# Patient Record
(demographics unavailable — no encounter records)

---

## 2018-11-02 NOTE — PDOC
Infectious Disease Note


Vital Sign


Vital Signs





Vital Signs








  Date Time  Temp Pulse Resp B/P (MAP) Pulse Ox O2 Delivery O2 Flow Rate FiO2


 


11/2/18 11:00 98.0 100 20 125/55 (78) 93 Room Air  





 98.0       











Labs


Lab





Laboratory Tests








Test


 11/1/18


17:54 11/1/18


17:55 11/1/18


19:00 11/1/18


20:21


 


Glucose (Fingerstick)


 162 mg/dL


(70-99) 


 


 133 mg/dL


(70-99)


 


White Blood Count


 


 1.8 x10^3/uL


(4.0-11.0) 


 





 


Red Blood Count


 


 3.22 x10^6/uL


(3.50-5.40) 


 





 


Hemoglobin


 


 9.4 g/dL


(12.0-15.5) 


 





 


Hematocrit


 


 27.4 %


(36.0-47.0) 


 





 


Mean Corpuscular Volume  85 fL ()   


 


Mean Corpuscular Hemoglobin  29 pg (25-35)   


 


Mean Corpuscular Hemoglobin


Concent 


 34 g/dL


(31-37) 


 





 


Red Cell Distribution Width


 


 19.6 %


(11.5-14.5) 


 





 


Platelet Count


 


 218 x10^3/uL


(140-400) 


 





 


Neutrophils (%) (Auto)  53 % (31-73)   


 


Lymphocytes (%) (Auto)  20 % (24-48)   


 


Monocytes (%) (Auto)  26 % (0-9)   


 


Eosinophils (%) (Auto)  0 % (0-3)   


 


Basophils (%) (Auto)  1 % (0-3)   


 


Neutrophils # (Auto)


 


 1.0 x10^3uL


(1.8-7.7) 


 





 


Lymphocytes # (Auto)


 


 0.4 x10^3/uL


(1.0-4.8) 


 





 


Monocytes # (Auto)


 


 0.5 x10^3/uL


(0.0-1.1) 


 





 


Eosinophils # (Auto)


 


 0.0 x10^3/uL


(0.0-0.7) 


 





 


Basophils # (Auto)


 


 0.0 x10^3/uL


(0.0-0.2) 


 





 


Segmented Neutrophils %  52 % (35-66)   


 


Band Neutrophils %  9 % (0-9)   


 


Lymphocytes %  13 % (24-48)   


 


Atypical Lymphocytes %


(Manual) 


 4 % (0-0) 


 


 





 


Monocytes %  22 % (0-10)   


 


Platelet Estimate


 


 Adequate


(ADEQUATE) 


 





 


Ovalocytes  Occ   


 


Rouleau  Present   


 


Sodium Level


 


 135 mmol/L


(136-145) 


 





 


Potassium Level


 


 3.6 mmol/L


(3.5-5.1) 


 





 


Chloride Level


 


 99 mmol/L


() 


 





 


Carbon Dioxide Level


 


 24 mmol/L


(21-32) 


 





 


Anion Gap  12 (6-14)   


 


Blood Urea Nitrogen


 


 16 mg/dL


(7-20) 


 





 


Creatinine


 


 0.8 mg/dL


(0.6-1.0) 


 





 


Estimated GFR


(Cockcroft-Gault) 


 68.5 


 


 





 


BUN/Creatinine Ratio  20 (6-20)   


 


Glucose Level


 


 171 mg/dL


(70-99) 


 





 


Calcium Level


 


 8.6 mg/dL


(8.5-10.1) 


 





 


Total Bilirubin


 


 0.5 mg/dL


(0.2-1.0) 


 





 


Aspartate Amino Transf


(AST/SGOT) 


 22 U/L (15-37) 


 


 





 


Alanine Aminotransferase


(ALT/SGPT) 


 17 U/L (14-59) 


 


 





 


Alkaline Phosphatase


 


 76 U/L


() 


 





 


Total Protein


 


 6.7 g/dL


(6.4-8.2) 


 





 


Albumin


 


 2.8 g/dL


(3.4-5.0) 


 





 


Albumin/Globulin Ratio  0.7 (1.0-1.7)   


 


Urine Collection Type   Unknown  


 


Urine Color   Yaz  


 


Urine Clarity   Clear  


 


Urine pH   5.5  


 


Urine Specific Gravity   1.025  


 


Urine Protein


 


 


 >=300 mg/dL


(NEG-TRACE) 





 


Urine Glucose (UA)


 


 


 Negative mg/dL


(NEG) 





 


Urine Ketones (Stick)


 


 


 Trace mg/dL


(NEG) 





 


Urine Blood   Small (NEG)  


 


Urine Nitrite   Negative (NEG)  


 


Urine Bilirubin   Negative (NEG)  


 


Urine Urobilinogen Dipstick


 


 


 0.2 mg/dL (0.2


mg/dL) 





 


Urine Leukocyte Esterase   Negative (NEG)  


 


Urine RBC   1-2 /HPF (0-2)  


 


Urine WBC


 


 


 11-20 /HPF


(0-4) 





 


Urine Squamous Epithelial


Cells 


 


 Few /LPF 


 





 


Urine Bacteria


 


 


 Many /HPF


(0-FEW) 





 


Urine Hyaline Casts   Many /HPF  


 


Urine Granular Casts   Few /HPF  


 


Urine Mucus   Marked /LPF  


 


Test


 11/2/18


07:37 11/2/18


10:46 11/2/18


13:50 





 


Glucose (Fingerstick)


 95 mg/dL


(70-99) 163 mg/dL


(70-99) 


 





 


White Blood Count


 


 


 2.0 x10^3/uL


(4.0-11.0) 





 


Red Blood Count


 


 


 2.94 x10^6/uL


(3.50-5.40) 





 


Hemoglobin


 


 


 8.6 g/dL


(12.0-15.5) 





 


Hematocrit


 


 


 24.8 %


(36.0-47.0) 





 


Mean Corpuscular Volume   84 fL ()  


 


Mean Corpuscular Hemoglobin   29 pg (25-35)  


 


Mean Corpuscular Hemoglobin


Concent 


 


 35 g/dL


(31-37) 





 


Red Cell Distribution Width


 


 


 20.0 %


(11.5-14.5) 





 


Platelet Count


 


 


 203 x10^3/uL


(140-400) 





 


Neutrophils (%) (Auto)   66 % (31-73)  


 


Lymphocytes (%) (Auto)   13 % (24-48)  


 


Monocytes (%) (Auto)   20 % (0-9)  


 


Eosinophils (%) (Auto)   1 % (0-3)  


 


Basophils (%) (Auto)   1 % (0-3)  


 


Neutrophils # (Auto)


 


 


 1.3 x10^3uL


(1.8-7.7) 





 


Lymphocytes # (Auto)


 


 


 0.3 x10^3/uL


(1.0-4.8) 





 


Monocytes # (Auto)


 


 


 0.4 x10^3/uL


(0.0-1.1) 





 


Eosinophils # (Auto)


 


 


 0.0 x10^3/uL


(0.0-0.7) 





 


Basophils # (Auto)


 


 


 0.0 x10^3/uL


(0.0-0.2) 














Objective


Assessment


Fever


Leukopenia. A dose Granix scheduled for tonight


Immunosuppression s/p chemoradiation


Diarrhea. C.diff pending


Herpes Zoster. 


   - Finished 7-day Famciclovir OP recently.


Pyuria, ? UTI - on cipro OP per PCP


Dysphagia 


Esophageal cancer, stage II s/p chemoradiation 


  -Diagnosed in August 2018


  -s/p Port placement on 9/17/2018


RA - on methotrexate  


A-fib, Xarelto 


DM w/ hypoglycemic episode of 60 


CAD


h/o stroke





Plan


Plan of Care


BC x 2


C. diff pending


CXR for dyspnea 


D/c cipro


Dose IV/po vanc, cefepime and micafungin need to cover potential port infection


Monitor labs/temp and renal function closely


f/u cultures results 








D/w RN 





Thank you





Attending Co-Sign


Attending Co-Sign


The patient was seen and interviewed as well as examined at the bedside. The 

chart was reviewed. The case was discussed. Agree with the plan of care.











TARUN SWANSON Nov 2, 2018 15:51


MEÑO HOOD MD Nov 2, 2018 16:20

## 2018-11-02 NOTE — RAD
Examination: CHEST AP ONLY

 

History: INPATIENT. DYSPNEA. PRIOR XRAY

 

Comparison/Correlation: CT chest abdomen and pelvis with contrast 

8/10/2018

 

Findings: Portable upright frontal views of the chest were obtained. Left 

internal jugular infusion port catheter tip terminates overlying superior 

vena cava. Sternal wires and mediastinal clips are present. Heart size is 

mildly enlarged. Pulmonary vasculature is mildly congested. No focal 

consolidation. No pneumothorax. Surgical clips involve the right axillary 

region. Minimal left costophrenic angle blunting is present.

 

Impression:

Mild CHF. Small left effusion. 

 

Electronically signed by: Angel Decker MD (11/2/2018 6:12 PM) [facility]

## 2018-11-02 NOTE — HP
ADMIT DATE:  11/01/2018



CHIEF COMPLAINT AND HISTORY OF PRESENT ILLNESS:  This 83-year-old white female

is well known to me from followup in the office.  The patient was admitted with

vomiting and diarrhea and inability to keep food down since the weekend.  She is

undergoing chemoradiation for cancer of the esophagus, currently.  The patient

was felt to be dehydrated and admitted for fluids as well as comfort care.



PAST MEDICAL HISTORY:  Remarkable for anemia, atrial fibrillation, coronary

artery disease, adenocarcinoma of the esophagus, diabetes, CVA.  She has a

history of coronary artery disease with prior stenting as well as a right

mastectomy and left lumpectomy for breast cancer and bilateral knee

replacements.



MEDICATIONS:  Brought with the patient, listed on the computer and have been

addressed.



ALLERGIES:  She is allergic to SINGULAIR and MILK.



SOCIAL HISTORY:  She is , nonsmoker, nondrinker, does not abuse drugs.



FAMILY HISTORY:  Noncontributory.



REVIEW OF SYSTEMS:  Remarkable for the weakness.  She denies any hematemesis,

melena with this.



PHYSICAL EXAMINATION:

GENERAL:  She is a well-developed, well-nourished, ill-appearing white female,

no severe distress.

VITAL SIGNS:  Stable.  She did have a temperature of 101.3 on admission and I

was not notified of the same.  I have discussed this with Oncology and with this

and leukopenia present on admission from the chemo, ID will be consulted

regarding the fever and need for antibiotics.

HEAD, EYES, EARS, NOSE AND THROAT:  Remarkable for some dryness of mucous

membranes.

NECK:  Supple, without adenopathy or thyromegaly.

CHEST:  Clear to auscultation.

HEART:  Irregularly irregular with controlled rate.

ABDOMEN:  Soft, nontender, without hepatosplenomegaly or masses.

EXTREMITIES:  Without cyanosis, clubbing, edema.

NEUROLOGIC:  She is intact.



IMPRESSION:

1.  Nausea, vomiting, diarrhea with dehydration in the face of ongoing

chemoradiation for cancer of the esophagus and leukopenia as well as stable

anemia on admission.

2.  Diabetes.

3.  Multiple other problems listed above.



PLAN:  The patient has been admitted, ongoing hydration will be obtained. 

Oncology is going to add white blood cell stimulating agents.  ID will be

consulted and the patient will be monitored, managed and treated appropriately.

 



______________________________

MARSHALL RICHARDS MD



DR:  VENKATA/tu  JOB#:  [id number] / [id number]

DD:  11/02/2018 08:51  DT:  11/02/2018 10:05

## 2018-11-02 NOTE — CONS
DATE OF CONSULTATION:  11/02/2018



REQUESTING PHYSICIAN:  Dr. Shawn Lake



REASON FOR CONSULTATION:  Esophageal cancer, status post chemoradiation therapy.



HISTORY OF PRESENT ILLNESS:  The patient is an 83-year-old  female who

was noted to have anemia in 08/2018 with hemoglobin of 8.0.  She underwent upper

endoscopy on 08/09/2018 which revealed multiple nodules in the esophagus

suggestive of malignancy.  Biopsy revealed poorly differentiated esophageal

adenocarcinoma.  CT scan of the chest, abdomen and pelvis on 08/10/2018 revealed

multiple mediastinal lymph nodes.  However, the PET scan on 08/30/2018 revealed

hypermetabolic distal esophageal mass, but no evidence of mediastinal

lymphadenopathy.  She was staged as a T1b N0 M0, stage I esophageal cancer.  She

was started on concurrent chemoradiation therapy with carboplatin and Taxol on

09/19/2018.  She received fourth dose on 10/24/2018 with carboplatin and Taxol. 

She then developed significant nausea, vomiting, generalized weakness, urinary

tract infection and shingles.  Hence chemotherapy was discontinued.  She

mentions that the radiation has also been discontinued.



She was admitted to [facility] on 11/01/2018 with poor oral

intake, generalized weakness and she also had a fever.  She also reports nausea,

vomiting and diarrhea.



The temperature was 101.3 on 11/01/2018.



PAST MEDICAL HISTORY:  TIA, coronary artery disease, CABG, hyperlipidemia,

hypertension, osteoarthritis, breast cancer, diabetes mellitus, anxiety, history

of left lumpectomy and right mastectomy.



FAMILY HISTORY:  Negative for esophageal cancer.



SOCIAL HISTORY:  No smoking or alcohol abuse.



REVIEW OF SYSTEMS:  A 12-point review of system was performed.  Pertinent

positives are mentioned in the history of present illness.  Rest of the system

review is negative.



PHYSICAL EXAMINATION:

GENERAL APPEARANCE:  The patient is an 83-year-old  female who is in no

acute cardiorespiratory distress.

VITAL SIGNS:  Blood pressure 125/55, temperature 98 degrees.

HEENT:  Head is atraumatic, normocephalic.  Eyes:  No icterus.

NECK:  Supple.

CHEST:  Bilaterally symmetrical.

HEART:  S1, S2 normal.

ABDOMEN:  Soft, nontender.

CENTRAL NERVOUS SYSTEM:  No focal deficits.

LYMPHATICS:  No lymphadenopathy.

SKIN:  Dry and warm.

PSYCHOLOGIC:  Mood and affect are appropriate.

MUSCULOSKELETAL:  No joint effusion.



LABORATORY DATA:  WBC 1.8, hemoglobin 9.4, platelet count 218, bands 9%. 

Creatinine 0.8, calcium 8.6.



IMPRESSION AND PLAN:

1.  Stage 1C poorly differentiated esophageal adenocarcinoma diagnosed on

08/09/2018.  She was started on concurrent chemoradiation on 09/19/2018.  She

received her fourth dose of carboplatin and Taxol on 10/24/2018.  Chemo was then

discontinued because of toxicities.  She had nausea, vomiting and generalized

weakness.  She subsequently also developed shingles.  No further chemotherapy

planned.  I will plan for a followup CT scan in about 2 months to evaluate for

response.

2.  Neutropenia due to chemotherapy.  I will recheck CBC today.  Plan to start

Granix 480 mcg at bedtime.

3.  Fever of 101.3 on 11/01/2018.  Consult Infectious Diseases.  Fever has now

improved.

4.  Rheumatoid arthritis.  She is on methotrexate weekly.

5.  Atrial fibrillation.  Follow up with Cardiology.

6.  History of cerebrovascular accident.

7.  Anemia.  Hemoglobin 9.4.  Continue to monitor.



I discussed with Dr. Bernardo.

 



______________________________

HERBERTH STEWART MD



DR:  JOSEPH/tu  JOB#:  [id number] / [id number]

DD:  11/02/2018 12:05  DT:  11/02/2018 22:54

## 2018-11-03 NOTE — PDOC
Infectious Disease Note


Subjective


Subjective


Rough morning


+ diarrhea w/ urgency 


Wore self out after showering and brushing teeth


+ chills 


No fevers last 24 hours





ROS


ROS


per HPI otherwise neg





Vital Sign


Vital Signs





Vital Signs








  Date Time  Temp Pulse Resp B/P (MAP) Pulse Ox O2 Delivery O2 Flow Rate FiO2


 


11/3/18 09:12  111  136/47    


 


11/3/18 08:00      Room Air  


 


11/3/18 07:00 97.7  20  92   





 97.7       











Physical Exam


PHYSICAL EXAM


GENERAL:  Sitting in the chair, alert, NAD looks well


HEENT:  Pupils equally round.  Normal conjunctivae. Oral cavity, pharynx is 

pink and moist.  No thrush.  


NECK:  Supple.


LUNGS:  Diminished aeration.


HEART:  S1 and S2.


ABDOMEN:  Obese, bowel sounds hyperactive, soft, nontender.


EXTREMITIES:  No gross edema or cyanosis.


SKIN:  Warm.  Localized rash over a sacral region 


NEUROLOGIC:  Alert and oriented x 3. 


Left-sided Port-A-Cath site without redness, swelling





Labs


Lab





Laboratory Tests








Test


 11/2/18


13:50 11/2/18


15:18 11/2/18


16:19 11/2/18


21:04


 


White Blood Count


 2.0 x10^3/uL


(4.0-11.0) 


 


 





 


Red Blood Count


 2.94 x10^6/uL


(3.50-5.40) 


 


 





 


Hemoglobin


 8.6 g/dL


(12.0-15.5) 


 


 





 


Hematocrit


 24.8 %


(36.0-47.0) 


 


 





 


Mean Corpuscular Volume 84 fL ()    


 


Mean Corpuscular Hemoglobin 29 pg (25-35)    


 


Mean Corpuscular Hemoglobin


Concent 35 g/dL


(31-37) 


 


 





 


Red Cell Distribution Width


 20.0 %


(11.5-14.5) 


 


 





 


Platelet Count


 203 x10^3/uL


(140-400) 


 


 





 


Neutrophils (%) (Auto) 66 % (31-73)    


 


Lymphocytes (%) (Auto) 13 % (24-48)    


 


Monocytes (%) (Auto) 20 % (0-9)    


 


Eosinophils (%) (Auto) 1 % (0-3)    


 


Basophils (%) (Auto) 1 % (0-3)    


 


Neutrophils # (Auto)


 1.3 x10^3uL


(1.8-7.7) 


 


 





 


Lymphocytes # (Auto)


 0.3 x10^3/uL


(1.0-4.8) 


 


 





 


Monocytes # (Auto)


 0.4 x10^3/uL


(0.0-1.1) 


 


 





 


Eosinophils # (Auto)


 0.0 x10^3/uL


(0.0-0.7) 


 


 





 


Basophils # (Auto)


 0.0 x10^3/uL


(0.0-0.2) 


 


 





 


Glucose (Fingerstick)


 


 60 mg/dL


(70-99) 100 mg/dL


(70-99) 164 mg/dL


(70-99)


 


Test


 11/3/18


04:00 11/3/18


08:22 


 





 


Creatinine


 0.7 mg/dL


(0.6-1.0) 


 


 





 


Estimated GFR


(Cockcroft-Gault) 79.9 


 


 


 





 


Glucose (Fingerstick)


 


 135 mg/dL


(70-99) 


 








CXR





Findings: Portable upright frontal views of the chest were obtained. Left 


internal jugular infusion port catheter tip terminates overlying superior 


vena cava. Sternal wires and mediastinal clips are present. Heart size is 


mildly enlarged. Pulmonary vasculature is mildly congested. No focal 


consolidation. No pneumothorax. Surgical clips involve the right axillary 


region. Minimal left costophrenic angle blunting is present.


 


Impression:


Mild CHF. Small left effusion





Objective


Assessment


Fever - looks better


Leukopenia. s/p Granix 11/2


Immunosuppression s/p chemoradiation


Diarrhea. C.diff pending


Herpes Zoster. 


   - Finished 7-day Famciclovir OP recently.


Pyuria, ? UTI - on cipro OP per PCP


Dysphagia 


Esophageal cancer, stage II s/p chemoradiation 


  -Diagnosed in August 2018


  -s/p Port placement on 9/17/2018


RA - on methotrexate  


A-fib, Xarelto 


DM w/ hypoglycemic episode of 60 


CAD


h/o stroke 


Mild CHF on cxr





Plan


Plan of Care


BC x 2 in process 


Continue IV vanc, cefepime and micafungin to need to cover potential port 

infection


Empiric po vanc. C. diff PCR pending 


Monitor labs/temp and renal function closely


f/u cultures results 


Supportive care





Attending Co-Sign


Attending Co-Sign


The patient was seen and interviewed as well as examined at the bedside. The 

chart was reviewed. The case was discussed. Agree with the plan of care.











TARUN SWANSON Nov 3, 2018 11:39


MEÑO HOOD MD Nov 3, 2018 13:21

## 2018-11-03 NOTE — CONS
DATE OF CONSULTATION:  2018



REFERRING PHYSICIAN:  Dr. Champion.



REASON FOR CONSULT:  Fever, status post chemotherapy.



HISTORY OF PRESENT ILLNESS:  The patient is an 83-year-old  female who

was diagnosed with stage 2 poorly-differentiated adenocarcinoma of the distal

esophagus in 2018.  She completed 25 rounds of radiation and 3 rounds of

chemotherapy over a week ago.  Soon after, she developed herpes zoster over her

sacral region for which she completed a 7-day course of famciclovir.  During the

last 3 days or so, she has developed nausea, vomiting and diarrhea.  She became

very weak and fatigued and had fallen at home.  She said she had a fever of

106.2 with chills.  Her white blood cell count was 1800, segs 52%, bands 9%. 

She was dehydrated and was given IV fluids.  During the interview, she started

to feel dizzy and a bit shaky.  Glucose reading was 60.  Symptoms resolved after

she ate.  The nausea and vomiting has since settled down, but she continues to

have frequent runny stools with urgency and at times incontinence and abdominal

bloating.  She was recently diagnosed with urinary tract infection and started

Cipro prior to admission.  She had had some burning sensation "inside my body

that traveled up to my chest" when she urinated.  She has been feeling

increasingly short of air with a mild dry cough and painful and at times

difficult to swallow.  She had a port placed in September and denies having any

issues.  She denies headaches or confusion.  She says the rash about the sacral

area burns and itches.



PAST MEDICAL HISTORY:  Diagnosed stage 2 poorly differentiated adenocarcinoma of

the distal esophagus in 2018, status post chemoradiation.  Herpes zoster over

sacral region.  Perla esophagus.  Atrial fibrillation, rheumatoid arthritis,

on weekly methotrexate treatment.  Coronary artery disease, diabetes mellitus. 

History of stroke/TIA.  Hypercholesterolemia, hypertension, depression, anxiety,

anemia.  History of breast cancer, pulmonary hypertension.



PAST SURGICAL HISTORY:  Port-A-Cath placement on 2018, tonsillectomy,

coronary artery bypass graft, cholecystectomy, right mastectomy, left

lumpectomy, bilateral knee surgery.



SOCIAL HISTORY:  The patient became a  in February of this year.  She has

no living children, 3  in childbirth.  She lives at home alone.  She is a

former smoker who quit in .  She drinks alcohol socially.



FAMILY HISTORY:  Noncontributory.



ALLERGIES:  MILK and MONTELUKAST.



REVIEW OF SYSTEMS:  Per HPI, otherwise all other review of systems are negative.



PHYSICAL EXAMINATION:

GENERAL:  The patient is slightly propped up in bed, alert, in no apparent

distress.

VITAL SIGNS:  Temperature is 98.0, T-max 101.3, blood pressure 125/55, heart

rate 100, respiratory rate 20, pulse oximetry is 93% on room air.  BMI 41.

HEENT:  Pupils equally round.  Normal conjunctivae. Oral cavity, pharynx is pink

and moist.  No thrush.  Upper dentures in place.

NECK:  Supple.

LUNGS:  Diminished aeration.

HEART:  S1 and S2.

ABDOMEN:  Obese, bowel sounds hyperactive, soft, nontender.

EXTREMITIES:  No gross edema or cyanosis.

SKIN:  Warm.  She has a localized rash over a sacral region covered with a

topical cream.  Left-sided Port-A-Cath site without redness, swelling or

drainage.

NEUROLOGIC:  Alert and oriented x 3.  She moves all extremities and is

ambulatory.



LABORATORY DATA:  Today's WBC 2.0, hemoglobin 8.6, platelet count 203,000,

neutrophils 66,000.  Sodium 135, potassium 3.6, creatinine 0.8.  BUN 16, glucose

171, total bilirubin 0.5, AST 22, ALT 17, albumin 2.8.  Urinalysis showed wbc's

11-20, many bacteria and a few squamous epithelial cells.  Urine culture in

process.  C. diff PCR pending.



IMPRESSION:

1.  Fever.

2.  Leukopenia.

3.  Immunosuppression, status post chemoradiation.

4.  Diarrhea.

5.  Herpes zoster treated with a 7-day course of famciclovir.

6.  Pyuria.

7.  Dysphagia.

8.  Esophageal cancer, status post chemoradiation.

9.  Rheumatoid arthritis, on weekly methotrexate treatments.

10.  Atrial fibrillation.

11.  Diabetes mellitus with hypoglycemic episode.

12.  Coronary artery disease.



PLAN:  Two sets of blood cultures have been ordered along with a chest x-ray. 

We will broaden antibiotics to cover for infection with vancomycin, cefepime and

micafungin.  We will also add p.o. vancomycin empirically as we wait for the C.

diff PCR results.  We will discontinue the Cipro.  Continue to monitor

laboratory values, temperature and renal function closely.  We will follow up on

culture results.  Supportive care.  Discussed with RN.



Thank you, Dr. Champion for asking us to participate in this patient's care.  Should

you have further questions or concerns, please call.  The patient seen, examined

and plan of care implemented by Dr. Meño Renae.



DICTATED BY:  This is Cliff Oliver, nursing practitioner

 



______________________________

MEÑO RENAE MD



DR:  JANETH/tu  JOB#:  [id number] / [id number]

DD:  2018 17:37  DT:  2018 03:25

## 2018-11-03 NOTE — PN
DATE:  11/03/2018



LOCATION:  She is in room 550.



SUBJECTIVE:  The patient is awake, alert, sitting on the toilet, just had an

accident with diarrhea and is being cleaned out.  She does feel like she feels

somewhat better than she has.  She does admit sweating during the night at least

on several occasions where she had to bathe and change clothes.



OBJECTIVE:

VITAL SIGNS:  Stable.  She has been afebrile for the last 24 hours.

GENERAL:  She is awake and alert.

CHEST:  Clear with diminished breath sounds at bases.

HEART:  Regular.

ABDOMEN:  Benign.



LABORATORY DATA:  White count was 2000 yesterday and hemoglobin was 8.6,

platelet count of 203,000.  She is receiving Neupogen for her leukopenia related

to chemoradiation.  She has 11-20 white blood cells on her admission urine.  ID

has got involved yesterday and have placed her on vancomycin as well as

micafungin and cefepime.  Blood cultures have been drawn and are negative today.



IMPRESSION:

1.  Fever with leukopenia in a patient on chemoradiation for cancer of the

esophagus.

2.  Diabetes.

3.  Vomiting and diarrhea.

4.  Anemia.

5.  Leukopenia.

6.  Atrial fibrillation.

7.  Coronary artery disease.

8.  Diabetes.

9.  Prior cerebrovascular accident.



PLAN:  Continue supportive care, IV antibiotics, await cultures and treat

symptomatically otherwise.

 



______________________________

MARSHALL RICHARDS MD



DR:  VENKATA/ut  JOB#:  [id number] / [id number]

DD:  11/03/2018 08:29  DT:  11/03/2018 11:54

## 2018-11-04 NOTE — PDOC
Infectious Disease Note


Subjective


Subjective


Feeling better over-all but still having ongoing diarrhea w/ stomach "rumblings,

" 3 so far this morning


Had some urinary retention early that released


Appetite good


Denies N/V/F/C/SOA





ROS


ROS


per HPI otherwise neg





Vital Sign


Vital Signs





Vital Signs








  Date Time  Temp Pulse Resp B/P (MAP) Pulse Ox O2 Delivery O2 Flow Rate FiO2


 


11/4/18 08:19  109  124/53    


 


11/4/18 08:00      Room Air  


 


11/4/18 07:00 97.9  20  93   





 97.9       


 


11/3/18 23:00       2.0 











Physical Exam


PHYSICAL EXAM


GENERAL:  Propped up in bed, alert, relaxed appearance 


HEENT:  Pupils equally round.  Normal conjunctivae. Oral cavity, pharynx is 

pink and moist.  No thrush.  


NECK:  Supple.


LUNGS:  Diminished aeration.


HEART:  S1 and S2.


ABDOMEN:  Obese, bowel sounds hyperactive, soft, nontender.


EXTREMITIES:  No gross edema or cyanosis.


SKIN:  Warm.  Localized rash over a sacral region 


NEUROLOGIC:  Alert and oriented x 3. 


Left-sided Port-A-Cath site without redness, swelling





Labs


Lab





Laboratory Tests








Test


 11/3/18


10:57 11/3/18


16:11 11/3/18


16:35 11/3/18


18:19


 


Glucose (Fingerstick)


 150 mg/dL


(70-99) 56 mg/dL


(70-99) 61 mg/dL


(70-99) 96 mg/dL


(70-99)


 


Test


 11/4/18


07:16 


 


 





 


Glucose (Fingerstick)


 90 mg/dL


(70-99) 


 


 











Micro





11/3/18 Blood Culture - Preliminary, Resulted


          NO GROWTH AFTER 1 DAY











URINE CULTURE RES 1  Preliminary  


        Comment





        Staphylococcus species


        25,000-50,000 colony forming units per mL





Objective


Assessment


Fever - resolved 


Leukopenia. s/p Granix 11/2 - improving 


Immunosuppression s/p chemoradiation


Diarrhea. C.diff neg 11/3 - ? Sec to chemo radiation


Herpes Zoster. 


   - Finished 7-day Famciclovir OP recently.


Pyuria, ? UTI - on cipro OP per PCP


Dysphagia 


Esophageal cancer, stage II s/p chemoradiation 


  -Diagnosed in August 2018


  -s/p Port placement on 9/17/2018


RA - on methotrexate  


A-fib, Xarelto 


DM w/ hypoglycemic episode of 60 


CAD


h/o stroke 


Mild CHF on cxr





Plan


Plan of Care


BC NGTD 


Continue IV vanc, cefepime and micafungin 


D/c po vanc


Check Stool cults - may need GI eval or scan


Monitor labs/temp and renal function closely


Supportive care 


D/w RN





Attending Co-Sign


Attending Co-Sign


The patient was seen and interviewed as well as examined at the bedside. The 

chart was reviewed. The case was discussed. Agree with the plan of care.











TARUN SWANSON Nov 4, 2018 10:41


MEÑO HOOD MD Nov 4, 2018 13:50

## 2018-11-05 NOTE — PDOC
Infectious Disease Note


Subjective


Subjective


Less diarrhea and urgency


Appetite diminished,


Hurts to swallow


Less sacral pain/burning 


No F/C/S/SOA





Vital Sign


Vital Signs





Vital Signs








  Date Time  Temp Pulse Resp B/P (MAP) Pulse Ox O2 Delivery O2 Flow Rate FiO2


 


11/5/18 11:00 98.2 119 20 142/62 (88) 90 Room Air  





 98.2       


 


11/5/18 03:00       2.0 











Physical Exam


PHYSICAL EXAM


GENERAL:  Propped up in bed, alert, relaxed appearance 


HEENT:  Oral cavity, pharynx is pink and moist.  


NECK:  Supple.


LUNGS:  Diminished aeration.


HEART:  S1 and S2.


ABDOMEN:  Obese, bowel sounds hyperactive, soft, nontender.


EXTREMITIES:  No gross edema or cyanosis.


SKIN:  Warm.  Localized rash over a sacral region - better 


NEUROLOGIC:  Alert and oriented x 3. 


Left-sided Port-A-Cath site without redness, swelling





Labs


Lab





Laboratory Tests








Test


 11/4/18


12:25 11/4/18


16:26 11/4/18


19:45 11/4/18


20:17


 


Glucose (Fingerstick)


 159 mg/dL


(70-99) 154 mg/dL


(70-99) 


 142 mg/dL


(70-99)


 


Vancomycin Level Trough


 


 


 11.8 mcg/mL


(10.0-20.0) 





 


Vancomycin Last Dose Date   Unk  


 


Vancomycin Last Dose Time   Unk  


 


Test


 11/5/18


03:50 11/5/18


07:16 11/5/18


10:39 





 


White Blood Count


 20.0 x10^3/uL


(4.0-11.0) 


 


 





 


Red Blood Count


 3.10 x10^6/uL


(3.50-5.40) 


 


 





 


Hemoglobin


 8.8 g/dL


(12.0-15.5) 


 


 





 


Hematocrit


 26.6 %


(36.0-47.0) 


 


 





 


Mean Corpuscular Volume 86 fL ()    


 


Mean Corpuscular Hemoglobin 28 pg (25-35)    


 


Mean Corpuscular Hemoglobin


Concent 33 g/dL


(31-37) 


 


 





 


Red Cell Distribution Width


 20.1 %


(11.5-14.5) 


 


 





 


Platelet Count


 228 x10^3/uL


(140-400) 


 


 





 


Neutrophils (%) (Auto) 93 % (31-73)    


 


Lymphocytes (%) (Auto) 2 % (24-48)    


 


Monocytes (%) (Auto) 4 % (0-9)    


 


Eosinophils (%) (Auto) 0 % (0-3)    


 


Basophils (%) (Auto) 1 % (0-3)    


 


Neutrophils # (Auto)


 18.6 x10^3uL


(1.8-7.7) 


 


 





 


Lymphocytes # (Auto)


 0.4 x10^3/uL


(1.0-4.8) 


 


 





 


Monocytes # (Auto)


 0.9 x10^3/uL


(0.0-1.1) 


 


 





 


Eosinophils # (Auto)


 0.0 x10^3/uL


(0.0-0.7) 


 


 





 


Basophils # (Auto)


 0.1 x10^3/uL


(0.0-0.2) 


 


 





 


Segmented Neutrophils % 73 % (35-66)    


 


Band Neutrophils % 20 % (0-9)    


 


Lymphocytes % 1 % (24-48)    


 


Monocytes % 6 % (0-10)    


 


Platelet Estimate


 Adequate


(ADEQUATE) 


 


 





 


Large Platelets Occ    


 


Anisocytosis Mod    


 


Sodium Level


 135 mmol/L


(136-145) 


 


 





 


Potassium Level


 4.0 mmol/L


(3.5-5.1) 


 


 





 


Chloride Level


 101 mmol/L


() 


 


 





 


Carbon Dioxide Level


 26 mmol/L


(21-32) 


 


 





 


Anion Gap 8 (6-14)    


 


Blood Urea Nitrogen 6 mg/dL (7-20)    


 


Creatinine


 0.8 mg/dL


(0.6-1.0) 


 


 





 


Estimated GFR


(Cockcroft-Gault) 68.5 


 


 


 





 


Glucose Level


 78 mg/dL


(70-99) 


 


 





 


Calcium Level


 7.1 mg/dL


(8.5-10.1) 


 


 





 


Glucose (Fingerstick)


 


 103 mg/dL


(70-99) 171 mg/dL


(70-99) 











Micro





11/3/18 Blood Culture - Preliminary, Resulted


          NO GROWTH AFTER 2 DAY











URINE CULTURE RES 1  Final  


        Staphylococcus aureus





        25,000-50,000 colony forming units per mL


        


  


                    MICS are expressed in micrograms per mL


           Antibiotic                 RSLT#1    


        Ciprofloxacin                  R>=8


        Gentamicin                     S<=0.5


        Levofloxacin                   R>=8


        Linezolid                      S =4


        Nitrofurantoin                 S<=16


        Oxacillin                      R>=4


        Penicillin                     R>=0.5


        Rifampin                       S<=0.5


        Tetracycline                   S<=1


        Trimethoprim/Sulfa             S<=10


        Vancomycin                     S =1





Objective


Assessment


Fever - resolved 


Leukopenia. s/p Granix 11/2 - improved


Immunosuppression s/p chemoradiation


Diarrhea. C.diff neg 11/3 - ? Sec to chemo radiation.  Staph aureus can cause 

GI issues


Herpes Zoster. 


   - Finished 7-day Famciclovir OP recently.


Pyuria, ? UTI - on cipro OP per PCP- MRSA on cult - ? contamination given neg 

nitrate/LE and + squamous cells


Dysphagia 


Esophageal cancer, stage II s/p chemoradiation 


  -Diagnosed in August 2018


  -s/p Port placement on 9/17/2018


RA - on methotrexate  


A-fib, Xarelto 


DM w/ hypoglycemic episode of 60 


CAD


h/o stroke 


Mild CHF on cxr


MRSA in urine, 11/1





Plan


Plan of Care


BC NGTD 


Continue IV vanc, cefepime and micafungin wean soon


Trough 11.8


Stool cults pending- Given MRSA in urine will check CT scan oral contrast


Monitor labs/temp and renal function closely


Supportive care 


D/w RN





Attending Co-Sign


Attending Co-Sign


The patient was seen and interviewed as well as examined at the bedside. The 

chart was reviewed. The case was discussed. Agree with the plan of care.











TARUN SWANSON Nov 5, 2018 12:04


MEÑO HOOD MD Nov 5, 2018 14:34

## 2018-11-05 NOTE — PN
DATE:  11/04/2018



ROOM:  550.



SUBJECTIVE:  The patient is awake and alert, feeling somewhat better.  Does

admit to 5 loose stools since I have seen her yesterday.



OBJECTIVE:

VITAL SIGNS:  Stable.  She is afebrile.

GENERAL:  She is awake and alert.  She remains mildly tachycardic with a heart

rate of approximately 100.



LABORATORY DATA:  Stool for C. diff is negative.  Sugars have been good.  White

count is up to 3800 this morning.  Hemoglobin was stable at 8.6.  Blood cultures

are negative to date.  There are some Staph 25-50,000 growth on her urine

culture.



IMPRESSION:

1.  Neutropenic fever, on broad spectrum IV antibiotics without a definite

source for infection to date.

2.  Diarrhea, negative for Clostridium difficile.

3.  Herpes zoster within the last couple of weeks, finished 7-day Famvir

prescription.

4.  Esophageal cancer, undergoing chemoradiation.

5.  Rheumatoid arthritis, on methotrexate.

6.  Atrial fibrillation, on Xarelto.

7.  Diabetes with good blood sugars.

8.  Coronary artery disease.



PLAN:  Continue present IV antibiotics per Pulmonology.  Stool cultures will be

checked.  If her diarrhea does not slow, GI may need to be involved here also.

 



______________________________

MARSHALL RICHARDS MD DR:  VENKATA/tu  JOB#:  [id number] / [id number]

DD:  11/04/2018 16:14  DT:  11/05/2018 01:53

## 2018-11-05 NOTE — PDOC
PROGRESS NOTES


Subjective


Subjective


HPI - f/u of Stage 1C poorly differentiated esophageal adenocarcinoma diagnosed 

on


08/09/2018.





ROS - no fever





Objective


Objective





Vital Signs








  Date Time  Temp Pulse Resp B/P (MAP) Pulse Ox O2 Delivery O2 Flow Rate FiO2


 


11/5/18 08:41  99  121/54    


 


11/5/18 07:00 98.2  20  96 Room Air  





 98.2       


 


11/5/18 03:00       2.0 














Intake and Output 


 


 11/5/18





 07:00


 


Intake Total 2400 ml


 


Output Total 1850 ml


 


Balance 550 ml


 


 


 


Intake Oral 900 ml


 


IV Total 1500 ml


 


Output Urine Total 1400 ml


 


Urine/Stool Mix 450 ml


 


# Voids 3


 


# Bowel Movements 2











Physical Exam


Heart:  Normal S1, Normal S2


General:  Alert, Oriented X3, No acute distress


Lungs:  Clear to auscultation


Neuro:  Normal speech


Psych/Mental Status:  Mental status NL





Assessment


Assessment


IMPRESSION AND PLAN:





1.  Stage 1C poorly differentiated esophageal adenocarcinoma diagnosed on


08/09/2018.  She was started on concurrent chemoradiation on 09/19/2018.  She


received her fourth dose of carboplatin and Taxol on 10/24/2018.  Chemo was then


discontinued because of toxicities.  She had nausea, vomiting and generalized


weakness.  She subsequently also developed shingles.  No further chemotherapy


planned.  I will plan for a followup CT scan in about 2 months to evaluate for


response.





2.  Neutropenia due to chemotherapy.  On Granix 480 mcg 11/2/18.


d/c on 11/5/18 as wbc 20 on 11/5/18.





3.  Fever of 101.3 on 11/01/2018.  Consulted Infectious Diseases.  Fever has now


improved.





4.  Rheumatoid arthritis.  She is on methotrexate weekly.


5.  Atrial fibrillation.  Follow up with Cardiology.


6.  History of cerebrovascular accident.


7.  Anemia.  Hemoglobin 9.4.  Continue to monitor.





I discussed with Dr. Bernardo.





Comment


Review of Relevant


I have reviewed the following items claudio (where applicable) has been applied.


Labs





Laboratory Tests








Test


 11/3/18


10:57 11/3/18


16:11 11/3/18


16:35 11/3/18


18:19


 


Glucose (Fingerstick)


 150 mg/dL


(70-99) 56 mg/dL


(70-99) 61 mg/dL


(70-99) 96 mg/dL


(70-99)


 


Test


 11/4/18


07:16 11/4/18


12:25 11/4/18


16:26 11/4/18


19:45


 


Glucose (Fingerstick)


 90 mg/dL


(70-99) 159 mg/dL


(70-99) 154 mg/dL


(70-99) 





 


Vancomycin Level Trough


 


 


 


 11.8 mcg/mL


(10.0-20.0)


 


Vancomycin Last Dose Date    Unk 


 


Vancomycin Last Dose Time    Unk 


 


Test


 11/4/18


20:17 11/5/18


03:50 11/5/18


07:16 





 


Glucose (Fingerstick)


 142 mg/dL


(70-99) 


 103 mg/dL


(70-99) 





 


White Blood Count


 


 20.0 x10^3/uL


(4.0-11.0) 


 





 


Red Blood Count


 


 3.10 x10^6/uL


(3.50-5.40) 


 





 


Hemoglobin


 


 8.8 g/dL


(12.0-15.5) 


 





 


Hematocrit


 


 26.6 %


(36.0-47.0) 


 





 


Mean Corpuscular Volume  86 fL ()   


 


Mean Corpuscular Hemoglobin  28 pg (25-35)   


 


Mean Corpuscular Hemoglobin


Concent 


 33 g/dL


(31-37) 


 





 


Red Cell Distribution Width


 


 20.1 %


(11.5-14.5) 


 





 


Platelet Count


 


 228 x10^3/uL


(140-400) 


 





 


Neutrophils (%) (Auto)  93 % (31-73)   


 


Lymphocytes (%) (Auto)  2 % (24-48)   


 


Monocytes (%) (Auto)  4 % (0-9)   


 


Eosinophils (%) (Auto)  0 % (0-3)   


 


Basophils (%) (Auto)  1 % (0-3)   


 


Neutrophils # (Auto)


 


 18.6 x10^3uL


(1.8-7.7) 


 





 


Lymphocytes # (Auto)


 


 0.4 x10^3/uL


(1.0-4.8) 


 





 


Monocytes # (Auto)


 


 0.9 x10^3/uL


(0.0-1.1) 


 





 


Eosinophils # (Auto)


 


 0.0 x10^3/uL


(0.0-0.7) 


 





 


Basophils # (Auto)


 


 0.1 x10^3/uL


(0.0-0.2) 


 





 


Sodium Level


 


 135 mmol/L


(136-145) 


 





 


Potassium Level


 


 4.0 mmol/L


(3.5-5.1) 


 





 


Chloride Level


 


 101 mmol/L


() 


 





 


Carbon Dioxide Level


 


 26 mmol/L


(21-32) 


 





 


Anion Gap  8 (6-14)   


 


Blood Urea Nitrogen  6 mg/dL (7-20)   


 


Creatinine


 


 0.8 mg/dL


(0.6-1.0) 


 





 


Estimated GFR


(Cockcroft-Gault) 


 68.5 


 


 





 


Glucose Level


 


 78 mg/dL


(70-99) 


 





 


Calcium Level


 


 7.1 mg/dL


(8.5-10.1) 


 











Laboratory Tests








Test


 11/4/18


12:25 11/4/18


16:26 11/4/18


19:45 11/4/18


20:17


 


Glucose (Fingerstick)


 159 mg/dL


(70-99) 154 mg/dL


(70-99) 


 142 mg/dL


(70-99)


 


Vancomycin Level Trough


 


 


 11.8 mcg/mL


(10.0-20.0) 





 


Vancomycin Last Dose Date   Unk  


 


Vancomycin Last Dose Time   Unk  


 


Test


 11/5/18


03:50 11/5/18


07:16 


 





 


White Blood Count


 20.0 x10^3/uL


(4.0-11.0) 


 


 





 


Red Blood Count


 3.10 x10^6/uL


(3.50-5.40) 


 


 





 


Hemoglobin


 8.8 g/dL


(12.0-15.5) 


 


 





 


Hematocrit


 26.6 %


(36.0-47.0) 


 


 





 


Mean Corpuscular Volume 86 fL ()    


 


Mean Corpuscular Hemoglobin 28 pg (25-35)    


 


Mean Corpuscular Hemoglobin


Concent 33 g/dL


(31-37) 


 


 





 


Red Cell Distribution Width


 20.1 %


(11.5-14.5) 


 


 





 


Platelet Count


 228 x10^3/uL


(140-400) 


 


 





 


Neutrophils (%) (Auto) 93 % (31-73)    


 


Lymphocytes (%) (Auto) 2 % (24-48)    


 


Monocytes (%) (Auto) 4 % (0-9)    


 


Eosinophils (%) (Auto) 0 % (0-3)    


 


Basophils (%) (Auto) 1 % (0-3)    


 


Neutrophils # (Auto)


 18.6 x10^3uL


(1.8-7.7) 


 


 





 


Lymphocytes # (Auto)


 0.4 x10^3/uL


(1.0-4.8) 


 


 





 


Monocytes # (Auto)


 0.9 x10^3/uL


(0.0-1.1) 


 


 





 


Eosinophils # (Auto)


 0.0 x10^3/uL


(0.0-0.7) 


 


 





 


Basophils # (Auto)


 0.1 x10^3/uL


(0.0-0.2) 


 


 





 


Sodium Level


 135 mmol/L


(136-145) 


 


 





 


Potassium Level


 4.0 mmol/L


(3.5-5.1) 


 


 





 


Chloride Level


 101 mmol/L


() 


 


 





 


Carbon Dioxide Level


 26 mmol/L


(21-32) 


 


 





 


Anion Gap 8 (6-14)    


 


Blood Urea Nitrogen 6 mg/dL (7-20)    


 


Creatinine


 0.8 mg/dL


(0.6-1.0) 


 


 





 


Estimated GFR


(Cockcroft-Gault) 68.5 


 


 


 





 


Glucose Level


 78 mg/dL


(70-99) 


 


 





 


Calcium Level


 7.1 mg/dL


(8.5-10.1) 


 


 





 


Glucose (Fingerstick)


 


 103 mg/dL


(70-99) 


 











Microbiology


11/3/18 Blood Culture - Preliminary, Resulted


          NO GROWTH AFTER 2 DAYS


11/1/18 Urine Culture - Final, Complete


          


11/1/18 Urine Culture Result 1 (HARRY) - Final, Complete


          


11/1/18 Antimicrobic Susceptibility - Final, Complete


Medications





Current Medications


Potassium Chloride/Sodium Chloride 1,000 ml @  100 mls/hr Q10H IV  Last 

administered on 11/5/18at 05:54;  Start 11/1/18 at 17:30


Alprazolam (Xanax) 0.25 mg PRN Q6HRS  PRN PO ANXIETY / AGITATION Last 

administered on 11/4/18at 21:05;  Start 11/1/18 at 17:15


Carvedilol (Coreg) 3.125 mg BIDWMEALS PO  Last administered on 11/5/18at 08:40;

  Start 11/1/18 at 18:00


Ciprofloxacin (Cipro) 250 mg BID PO  Last administered on 11/2/18at 10:45;  

Start 11/1/18 at 21:00;  Stop 11/2/18 at 15:50;  Status DC


Citalopram Hydrobromide (CeleXA) 10 mg DAILY PO  Last administered on 11/2/18at 

10:45;  Start 11/1/18 at 18:00;  Stop 11/3/18 at 18:26;  Status DC


Diphenhydramine HCl (Benadryl) 50 mg QHS PO  Last administered on 11/4/18at 21:

05;  Start 11/1/18 at 21:00


Docusate Sodium (Colace) 100 mg DAILY PO  Last administered on 11/5/18at 08:39;

  Start 11/1/18 at 18:00


Insulin Glargine (Lantus) 17 units DAILYWBKFT SQ  Last administered on 11/5/ 18at 08:53;  Start 11/2/18 at 08:00


Isosorbide Mononitrate (Imdur) 30 mg DAILY PO  Last administered on 11/5/18at 08

:39;  Start 11/1/18 at 18:00


Lisinopril (Prinivil) 10 mg DAILY PO  Last administered on 11/5/18at 08:41;  

Start 11/1/18 at 18:00


Rivaroxaban (Xarelto) 15 mg DAILYWSUP PO  Last administered on 11/4/18at 16:47;

  Start 11/1/18 at 18:00


Multivitamins/ Minerals (I-Josias) 1 tab BID PO  Last administered on 11/5/18at 08

:39;  Start 11/1/18 at 21:00


Bumetanide (Bumex) 2 mg DAILY PO  Last administered on 11/5/18at 08:38;  Start 

11/1/18 at 18:00


Vitamin D (Vitamin D3) 1,000 unit DAILY PO  Last administered on 11/1/18at 17:53

;  Start 11/1/18 at 18:00;  Stop 11/1/18 at 19:01;  Status DC


Non-Formulary Medication 1 ea TID PO  Last administered on 11/2/18at 10:48;  

Start 11/1/18 at 21:00


Insulin Human Lispro (HumaLOG) 4 units TIDWMEALS SQ  Last administered on 11/4/ 18at 17:00;  Start 11/1/18 at 18:00


Methotrexate (Rheumatrex) 15 mg WEEKLY PO  Last administered on 11/1/18at 17:55

;  Start 11/1/18 at 18:00


Multivitamins (Thera M Plus) 1 tab DAILY PO  Last administered on 11/5/18at 08:

39;  Start 11/1/18 at 18:00


Pantoprazole Sodium (Protonix) 40 mg DAILYAC PO  Last administered on 11/5/18at 

08:40;  Start 11/1/18 at 18:00


Ondansetron HCl (Zofran Odt) 8 mg PRN BID  PRN PO NAUSEA/VOMITING;  Start 11/1/ 18 at 17:30


Simvastatin (Zocor) 40 mg QHS PO  Last administered on 11/4/18at 21:05;  Start 

11/1/18 at 21:00


Info (Anti-Coagulation Monitoring By Pharmacy) 1 each PRN DAILY  PRN MC SEE 

COMMENTS Last administered on 11/4/18at 14:39;  Start 11/1/18 at 17:30


Lactobacillus Rhamnosus (Culturelle) 1 cap BID PO  Last administered on 11/1/ 18at 22:22;  Start 11/1/18 at 21:00;  Stop 11/2/18 at 08:28;  Status DC


Acetaminophen (Tylenol) 500 mg PRN Q6HRS  PRN PO MILD PAIN / TEMP;  Start 11/1/ 18 at 19:00


Tbo-Filgrastim (Granix) 480 mcg QHS SQ  Last administered on 11/4/18at 21:08;  

Start 11/2/18 at 21:00;  Stop 11/5/18 at 08:17;  Status DC


Vancomycin HCl (Vanco Per Pharmacy) 1 each PRN DAILY  PRN MC SEE COMMENTS Last 

administered on 11/5/18at 00:04;  Start 11/2/18 at 15:45


Cefepime HCl 1 gm/ Dextrose 50 ml @  100 mls/hr Q8HRS IV ;  Start 11/2/18 at 22:

00;  Status UNV


Micafungin Sodium 100 mg/Dextrose 100 ml @  100 mls/hr Q24H IV  Last 

administered on 11/4/18at 16:47;  Start 11/2/18 at 17:00


Cefepime HCl (Maxipime) 1 gm Q8HRS IVP  Last administered on 11/5/18at 05:55;  

Start 11/2/18 at 16:30


Vancomycin HCl 2 gm/Sodium Chloride 500 ml @  250 mls/hr 1X  ONCE IV  Last 

administered on 11/2/18at 19:36;  Start 11/2/18 at 16:30;  Stop 11/2/18 at 18:29

;  Status DC


Vancomycin HCl 1.5 gm/Sodium Chloride 500 ml @  250 mls/hr Q24H IV  Last 

administered on 11/4/18at 21:07;  Start 11/3/18 at 19:30;  Stop 11/4/18 at 23:53

;  Status DC


Vancomycin HCl (Vancomycin Trough Level) 1 each 1X  ONCE MC  Last administered 

on 11/4/18at 19:00;  Start 11/4/18 at 19:00;  Stop 11/4/18 at 19:01;  Status DC


Vancomycin HCl (Vancomycin Oral Solution) 125 mg [id number] PO  Last administered 

on 11/4/18at 10:13;  Start 11/3/18 at 13:00;  Stop 11/4/18 at 10:42;  Status DC


Dextrose (Dextrose 50%-Water Syringe) 12.5 gm PRN Q15MIN  PRN IV SEE COMMENTS;  

Start 11/3/18 at 17:00


Citalopram Hydrobromide (CeleXA) 10 mg QHS PO  Last administered on 11/4/18at 21

:05;  Start 11/3/18 at 21:00


Vancomycin HCl 1.5 gm/Sodium Chloride 500 ml @  250 mls/hr Q18H IV ;  Start 11/5 /18 at 15:30


Vancomycin HCl (Vancomycin Trough Level) 1 each 1X  ONCE MC ;  Start 11/6/18 at 

09:00;  Stop 11/6/18 at 09:01





Active Scripts


Active


Reported


Famciclovir 500 Mg Tablet 500 Mg PO TID 1 Days


Ciprofloxacin Hcl 250 Mg Tablet 1 Tab PO BID


Zofran (Ondansetron Hcl) 8 Mg Tablet 8 Mg PO BID PRN


Xarelto (Rivaroxaban) 15 Mg Tablet 15 Mg PO DAILY


Carvedilol 3.125 Mg Tablet 3.125 Mg PO BID


Methotrexate (Methotrexate Sodium) 2.5 Mg Tablet 6 Tab PO WEEKLY


Benadryl (Diphenhydramine Hcl) 25 Mg Capsule 2 Cap PO QHS


Bumetanide 2 Mg Tablet 2 Mg PO DAILY


Ocuvite Tablet (Vit A,C & E/Lutein/Minerals) 1 Each Tablet 1 Each PO BID


Citalopram Hbr (Citalopram Hydrobromide) 10 Mg Tablet 10 Mg PO DAILY


Alprazolam 0.25 Mg Tablet 0.25 Mg PO PRN Q6HRS PRN


Isosorbide Mononitrate Er (Isosorbide Mononitrate) 30 Mg Tab.er.24h 30 Mg PO 

DAILY


Prilosec Otc (Omeprazole Magnesium) 20 Mg Tablet.dr 20 Mg PO DAILY


Novolog Flexpen (Insulin Aspart) 100 Unit/1 Ml Insuln.pen 4 Unit SQ TIDBFRMEAL


Lantus Solostar (Insulin Glargine,Hum.rec.anlog) 100 Unit/1 Ml Insuln.pen 17 

Unit SQ [id number]


Vitamin D-3 (Cholecalciferol (Vitamin D3)) 2,000 Unit Capsule 1,000 Unit PO 

DAILY


Lisinopril 10 Mg Tablet 10 Mg PO DAILY


Simvastatin 40 Mg Tablet 40 Mg PO DAILY


Colace (Docusate Sodium) 100 Mg Capsule 100 Mg PO DAILY


Garlic 1,000 Mg Capsule 1,000 Mg PO DAILY


Multivitamins (Multivitamin) 1 Each Capsule 1 Each PO DAILY


Vitals/I & O





Vital Sign - Last 24 Hours








 11/4/18 11/4/18 11/4/18 11/4/18





 10:47 15:00 16:48 19:00


 


Temp 97.8 97.8  99.9





 97.8 97.8  99.9


 


Pulse 101 104 104 97


 


Resp 20 20  18


 


B/P (MAP) 122/58 (79) 125/60 (81) 125/60 112/46 (68)


 


Pulse Ox 94 95  93


 


O2 Delivery Room Air Room Air  Room Air


 


    





    





 11/4/18 11/4/18 11/5/18 11/5/18





 20:00 23:00 03:00 07:00


 


Temp  99.3 98.2 98.2





  99.3 98.2 98.2


 


Pulse  96 97 99


 


Resp  18 18 20


 


B/P (MAP)  142/42 (75) 97/40 (59) 121/54 (76)


 


Pulse Ox  98 95 96


 


O2 Delivery Room Air Nasal Cannula Nasal Cannula Room Air


 


O2 Flow Rate  2.0 2.0 


 


    





    





 11/5/18 11/5/18 11/5/18 





 08:39 08:40 08:41 


 


Pulse 99 99 99 


 


B/P (MAP) 121/54 121/54 121/54 














Intake and Output   


 


 11/4/18 11/4/18 11/5/18





 15:00 23:00 07:00


 


Intake Total 600 ml 300 ml 1500 ml


 


Output Total 1400 ml 450 ml 0 ml


 


Balance -800 ml -150 ml 1500 ml

















HERBERTH STEWART MD Nov 5, 2018 09:13

## 2018-11-06 NOTE — PDOC
Infectious Disease Note


Subjective


Subjective


Had loose stool this am and felt sweats


Still Appetite diminished,


Hurts to swallow


Less sacral pain/burning 


No F/C/S/SOA





ROS


ROS


o/w neg





Vital Sign


Vital Signs





Vital Signs








  Date Time  Temp Pulse Resp B/P (MAP) Pulse Ox O2 Delivery O2 Flow Rate FiO2


 


11/6/18 08:48  100  118/54    


 


11/6/18 07:00 100.0  20  93 Room Air  





 100.0       











Physical Exam


PHYSICAL EXAM


GENERAL:  Propped up in bed, alert, relaxed appearance 


HEENT:  Oral cavity, pharynx is pink and moist.  


NECK:  Supple.


LUNGS:  Diminished aeration.


HEART:  S1 and S2.


ABDOMEN:  Obese, bowel sounds hyperactive, soft, nontender.


EXTREMITIES:  No gross edema or cyanosis.


SKIN:  Warm.  Localized rash over a sacral region - better 


NEUROLOGIC:  Alert and oriented x 3. 


Left-sided Port-A-Cath site without redness, swelling





Labs


Lab





Laboratory Tests








Test


 11/5/18


10:39 11/5/18


16:27 11/6/18


07:57 11/6/18


08:45


 


Glucose (Fingerstick)


 171 mg/dL


(70-99) 98 mg/dL


(70-99) 117 mg/dL


(70-99) 





 


Vancomycin Level Trough


 


 


 


 22.5 mcg/mL


(10.0-20.0)


 


Vancomycin Last Dose Date    11/5/18 


 


Vancomycin Last Dose Time    1530 








Micro





Left chest Port-A-Cath. Right mastectomy. Right axillary surgical clips. 


Coronary artery disease, median sternotomy wires, prior CABG. Pulmonary 


trunk is dilated suggestive of pulmonary arterial hypertension. 


Mediastinal lymph nodes, no adenopathy. Cardiomegaly. No pericardial 


effusion.


 


No pleural effusion. Central airways are patent. Respiratory motion 


artifact. A few subcentimeter lung nodules are unchanged, for example 


image 25 in the right lung. Mild atelectasis or scarring in the lower 


lobes. Right middle lobe calcified granuloma.


 


Unchanged thickening of the distal esophagus.


 


Cholecystectomy. Subtle hypodense liver masses are similar to prior study,


much more conspicuous on PET. Calcified granulomas in the spleen. 


Pancreas, adrenal glands, and abdominal aorta caliber are normal. Moderate


atherosclerotic calcification of the abdominal aorta.


 


No hydronephrosis. Right extrarenal pelvis.


 


Stomach unremarkable. No small bowel obstruction. There is no colon wall 


thickening.


 


Urinary bladder is normal. Atrophic uterus. No pelvic free fluid.


 


Degenerative spondylosis of the lumbar spine. Grade 1 anterolisthesis of 


L4 on L5 and L5 on S1. Grade 1 retrolisthesis of L3 on L4 and L2 on L3.


 


IMPRESSION:


1.  Unchanged thickening of the distal esophagus.


2.  Subtle hypodense liver masses are not significantly changed.


3.  There are a few subcentimeter pulmonary nodules that are unchanged.








Microbiology


11/3/18 Blood Culture - Preliminary, Resulted


          NO GROWTH AFTER 3 DAYS


11/1/18 Urine Culture - Final, Complete


          


11/1/18 Urine Culture Result 1 (HARRY) - Final, Complete


          


11/1/18 Antimicrobic Susceptibility - Final, Complete





Objective


Assessment


Fever - resolved - back ? Vanc


MRSA in urine


Leukopenia. s/p Granix 11/2 - improved


Immunosuppression s/p chemoradiation


Diarrhea. C.diff neg 11/3 - ? Sec to chemo radiation.  Staph aureus can cause 

GI issues. Hopefully contrast induced loose stool.  CT unrevealing


Herpes Zoster. 


   - Finished 7-day Famciclovir OP recently.


Pyuria, ? UTI - on cipro OP per PCP- MRSA on cult - ? contamination given neg 

nitrate/LE and + squamous cells


Dysphagia 


Esophageal cancer, stage II s/p chemoradiation 


  -Diagnosed in August 2018


  -s/p Port placement on 9/17/2018


RA - on methotrexate  


A-fib, Xarelto 


DM w/ hypoglycemic episode of 60 


CAD


h/o stroke 


Mild CHF on cxr


MRSA in urine, 11/1





Plan


Plan of Care


BC NGTD 


Discontinue IV vanc and begin Zyvox (IV given loose stools)


Cont cefepime and micafungin wean soon.  Dose Zyvox


Stool cults pending


Monitor labs in am /temp and renal function closely


Supportive care 


D/w MEÑO DOVER MD Nov 6, 2018 10:01

## 2018-11-06 NOTE — PN
DATE:  11/05/2018



LOCATION:  She is in room 550.



SUBJECTIVE:  The patient is awake and alert, feeling somewhat better; however,

is still having multiple stools with greater than 10 in the last 24 hours.



OBJECTIVE:

VITAL SIGNS:  Stable.  T-max 99.9.



LABORATORY DATA:  [place] [address] this morning with white cell

stimulator per heme/onc.  Hemoglobin is stable at 8.8, platelets are 228,000. 

BMP is essentially unremarkable, although her calcium is depressed at 7.1, which

is definitely different than on admission where it was 8.6 with an albumin of

2.8.  Stool for C. diff is negative.  Other stool studies are pending at this

point in time that GI have ordered.



ASSESSMENT:

1.  Neutropenic fever, improving on broad spectrum antibiotics.

2.  Neutropenia, resolved.

3.  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

4.  Rheumatoid arthritis.

5.  Atrial fibrillation.

6.  History of cerebrovascular accident.

7.  Anemia.



PLAN:  Continue antibiotics per ID.  Await final stool studies.  I would prefer

to wait to give her something to slow down stools prior to knowing all the stool

cultures are negative.  Otherwise same.

 



______________________________

MARSHALL RICHARDS MD



DR:  VENKATA/tu  JOB#:  [id number] / [id number]

DD:  11/05/2018 17:09  DT:  11/06/2018 07:06

## 2018-11-06 NOTE — RAD
PQRS Compliance Statement:

 

One or more of the following individualized dose reduction techniques were

utilized for this examination:  

1. Automated exposure control  

2. Adjustment of the mA and/or kV according to patient size  

3. Use of iterative reconstruction technique

 

 

CT CHEST ABDOMEN PELVIS WO

 

Clinical Indication: MRSA IN URINE.  Loose stool.

 

Comparison: FDG PET/CT, skull base to upper thighs, August 30, 2018.

 

Technique: Helical CT imaging of the chest, abdomen and pelvis is 

performed without IV contrast.

 

Findings: 

Evaluation of vascular structures and solid organs is limited without IV 

contrast, decreasing sensitivity for detection of pathology.

 

Left chest Port-A-Cath. Right mastectomy. Right axillary surgical clips. 

Coronary artery disease, median sternotomy wires, prior CABG. Pulmonary 

trunk is dilated suggestive of pulmonary arterial hypertension. 

Mediastinal lymph nodes, no adenopathy. Cardiomegaly. No pericardial 

effusion.

 

No pleural effusion. Central airways are patent. Respiratory motion 

artifact. A few subcentimeter lung nodules are unchanged, for example 

image 25 in the right lung. Mild atelectasis or scarring in the lower 

lobes. Right middle lobe calcified granuloma.

 

Unchanged thickening of the distal esophagus.

 

Cholecystectomy. Subtle hypodense liver masses are similar to prior study,

much more conspicuous on PET. Calcified granulomas in the spleen. 

Pancreas, adrenal glands, and abdominal aorta caliber are normal. Moderate

atherosclerotic calcification of the abdominal aorta.

 

No hydronephrosis. Right extrarenal pelvis.

 

Stomach unremarkable. No small bowel obstruction. There is no colon wall 

thickening.

 

Urinary bladder is normal. Atrophic uterus. No pelvic free fluid.

 

Degenerative spondylosis of the lumbar spine. Grade 1 anterolisthesis of 

L4 on L5 and L5 on S1. Grade 1 retrolisthesis of L3 on L4 and L2 on L3.

 

IMPRESSION:

1.  Unchanged thickening of the distal esophagus.

2.  Subtle hypodense liver masses are not significantly changed.

3.  There are a few subcentimeter pulmonary nodules that are unchanged.

 

Electronically signed by: Tavo Jeronimo MD (11/6/2018 5:00 AM) [id number]

## 2018-11-06 NOTE — PDOC
PROGRESS NOTES


Subjective


Subjective


HPI - f/u of Stage 1C poorly differentiated esophageal adenocarcinoma diagnosed 

on


08/09/2018.





ROS - no CP, no fever





Objective


Objective





Vital Signs








  Date Time  Temp Pulse Resp B/P (MAP) Pulse Ox O2 Delivery O2 Flow Rate FiO2


 


11/6/18 08:48  100  118/54    


 


11/6/18 07:00 100.0  20  93 Room Air  





 100.0       


 


11/5/18 03:00       2.0 














Intake and Output 


 


 11/6/18





 07:00


 


Intake Total 2270 ml


 


Output Total 200 ml


 


Balance 2070 ml


 


 


 


Intake Oral 1020 ml


 


IV Total 1250 ml


 


Output Urine Total 200 ml


 


# Voids 2











Physical Exam


Heart:  Normal S1, Normal S2


General:  Alert, Oriented X3


Lungs:  Clear to auscultation


Neuro:  Normal speech


Psych/Mental Status:  Mental status NL





Assessment


Assessment


IMPRESSION AND PLAN:





1.  Stage 1C poorly differentiated esophageal adenocarcinoma diagnosed on


08/09/2018.  She was started on concurrent chemoradiation on 09/19/2018.  She


received her fourth dose of carboplatin and Taxol on 10/24/2018.  Chemo was then


discontinued because of toxicities.  She had nausea, vomiting and generalized


weakness.  She subsequently also developed shingles.  No further chemotherapy


planned.  I will plan for a followup CT scan in about 2 months to evaluate for


response.





2.  Neutropenia due to chemotherapy.  On Granix 480 mcg 11/2/18.


d/c on 11/5/18 as wbc 20 on 11/5/18.





3.  Fever of 101.3 on 11/01/2018.  Consulted Infectious Diseases.  Fever has now


improved.





4.  Rheumatoid arthritis.  She is on methotrexate weekly.


5.  Atrial fibrillation.  Follow up with Cardiology.


6.  History of cerebrovascular accident.


7.  Anemia.  Hemoglobin 8.8.  Continue to monitor.





Comment


Review of Relevant


I have reviewed the following items claudio (where applicable) has been applied.


Labs





Laboratory Tests








Test


 11/4/18


12:25 11/4/18


16:26 11/4/18


19:45 11/4/18


20:17


 


Glucose (Fingerstick)


 159 mg/dL


(70-99) 154 mg/dL


(70-99) 


 142 mg/dL


(70-99)


 


Vancomycin Level Trough


 


 


 11.8 mcg/mL


(10.0-20.0) 





 


Vancomycin Last Dose Date   Unk  


 


Vancomycin Last Dose Time   Unk  


 


Test


 11/5/18


03:50 11/5/18


07:16 11/5/18


10:39 11/5/18


16:27


 


White Blood Count


 20.0 x10^3/uL


(4.0-11.0) 


 


 





 


Red Blood Count


 3.10 x10^6/uL


(3.50-5.40) 


 


 





 


Hemoglobin


 8.8 g/dL


(12.0-15.5) 


 


 





 


Hematocrit


 26.6 %


(36.0-47.0) 


 


 





 


Mean Corpuscular Volume 86 fL ()    


 


Mean Corpuscular Hemoglobin 28 pg (25-35)    


 


Mean Corpuscular Hemoglobin


Concent 33 g/dL


(31-37) 


 


 





 


Red Cell Distribution Width


 20.1 %


(11.5-14.5) 


 


 





 


Platelet Count


 228 x10^3/uL


(140-400) 


 


 





 


Neutrophils (%) (Auto) 93 % (31-73)    


 


Lymphocytes (%) (Auto) 2 % (24-48)    


 


Monocytes (%) (Auto) 4 % (0-9)    


 


Eosinophils (%) (Auto) 0 % (0-3)    


 


Basophils (%) (Auto) 1 % (0-3)    


 


Neutrophils # (Auto)


 18.6 x10^3uL


(1.8-7.7) 


 


 





 


Lymphocytes # (Auto)


 0.4 x10^3/uL


(1.0-4.8) 


 


 





 


Monocytes # (Auto)


 0.9 x10^3/uL


(0.0-1.1) 


 


 





 


Eosinophils # (Auto)


 0.0 x10^3/uL


(0.0-0.7) 


 


 





 


Basophils # (Auto)


 0.1 x10^3/uL


(0.0-0.2) 


 


 





 


Segmented Neutrophils % 73 % (35-66)    


 


Band Neutrophils % 20 % (0-9)    


 


Lymphocytes % 1 % (24-48)    


 


Monocytes % 6 % (0-10)    


 


Platelet Estimate


 Adequate


(ADEQUATE) 


 


 





 


Large Platelets Occ    


 


Anisocytosis Mod    


 


Sodium Level


 135 mmol/L


(136-145) 


 


 





 


Potassium Level


 4.0 mmol/L


(3.5-5.1) 


 


 





 


Chloride Level


 101 mmol/L


() 


 


 





 


Carbon Dioxide Level


 26 mmol/L


(21-32) 


 


 





 


Anion Gap 8 (6-14)    


 


Blood Urea Nitrogen 6 mg/dL (7-20)    


 


Creatinine


 0.8 mg/dL


(0.6-1.0) 


 


 





 


Estimated GFR


(Cockcroft-Gault) 68.5 


 


 


 





 


Glucose Level


 78 mg/dL


(70-99) 


 


 





 


Calcium Level


 7.1 mg/dL


(8.5-10.1) 


 


 





 


Glucose (Fingerstick)


 


 103 mg/dL


(70-99) 171 mg/dL


(70-99) 98 mg/dL


(70-99)


 


Test


 11/6/18


07:57 11/6/18


08:45 11/6/18


11:20 





 


Glucose (Fingerstick)


 117 mg/dL


(70-99) 


 151 mg/dL


(70-99) 





 


Vancomycin Level Trough


 


 22.5 mcg/mL


(10.0-20.0) 


 





 


Vancomycin Last Dose Date  11/5/18   


 


Vancomycin Last Dose Time  1530   








Laboratory Tests








Test


 11/5/18


16:27 11/6/18


07:57 11/6/18


08:45 11/6/18


11:20


 


Glucose (Fingerstick)


 98 mg/dL


(70-99) 117 mg/dL


(70-99) 


 151 mg/dL


(70-99)


 


Vancomycin Level Trough


 


 


 22.5 mcg/mL


(10.0-20.0) 





 


Vancomycin Last Dose Date   11/5/18  


 


Vancomycin Last Dose Time   1530  








Microbiology


11/3/18 Blood Culture - Preliminary, Resulted


          NO GROWTH AFTER 3 DAYS


11/1/18 Urine Culture - Final, Complete


          


11/1/18 Urine Culture Result 1 (HARRY) - Final, Complete


          


11/1/18 Antimicrobic Susceptibility - Final, Complete


Medications





Current Medications


Potassium Chloride/Sodium Chloride 1,000 ml @  100 mls/hr Q10H IV  Last 

administered on 11/6/18at 08:24;  Start 11/1/18 at 17:30


Alprazolam (Xanax) 0.25 mg PRN Q6HRS  PRN PO ANXIETY / AGITATION Last 

administered on 11/5/18at 20:30;  Start 11/1/18 at 17:15


Carvedilol (Coreg) 3.125 mg BIDWMEALS PO  Last administered on 11/6/18at 08:48;

  Start 11/1/18 at 18:00


Ciprofloxacin (Cipro) 250 mg BID PO  Last administered on 11/2/18at 10:45;  

Start 11/1/18 at 21:00;  Stop 11/2/18 at 15:50;  Status DC


Citalopram Hydrobromide (CeleXA) 10 mg DAILY PO  Last administered on 11/2/18at 

10:45;  Start 11/1/18 at 18:00;  Stop 11/3/18 at 18:26;  Status DC


Diphenhydramine HCl (Benadryl) 50 mg QHS PO  Last administered on 11/5/18at 20:

24;  Start 11/1/18 at 21:00


Docusate Sodium (Colace) 100 mg DAILY PO  Last administered on 11/5/18at 08:39;

  Start 11/1/18 at 18:00


Insulin Glargine (Lantus) 17 units DAILYWBKFT SQ  Last administered on 11/6/ 18at 09:04;  Start 11/2/18 at 08:00


Isosorbide Mononitrate (Imdur) 30 mg DAILY PO  Last administered on 11/6/18at 08

:47;  Start 11/1/18 at 18:00


Lisinopril (Prinivil) 10 mg DAILY PO  Last administered on 11/6/18at 08:47;  

Start 11/1/18 at 18:00


Rivaroxaban (Xarelto) 15 mg DAILYWSUP PO  Last administered on 11/5/18at 17:32;

  Start 11/1/18 at 18:00


Multivitamins/ Minerals (I-Josias) 1 tab BID PO  Last administered on 11/6/18at 08

:46;  Start 11/1/18 at 21:00


Bumetanide (Bumex) 2 mg DAILY PO  Last administered on 11/6/18at 08:29;  Start 

11/1/18 at 18:00


Vitamin D (Vitamin D3) 1,000 unit DAILY PO  Last administered on 11/1/18at 17:53

;  Start 11/1/18 at 18:00;  Stop 11/1/18 at 19:01;  Status DC


Non-Formulary Medication 1 ea TID PO  Last administered on 11/2/18at 10:48;  

Start 11/1/18 at 21:00


Insulin Human Lispro (HumaLOG) 4 units TIDWMEALS SQ  Last administered on 11/6/ 18at 09:03;  Start 11/1/18 at 18:00


Methotrexate (Rheumatrex) 15 mg WEEKLY PO  Last administered on 11/1/18at 17:55

;  Start 11/1/18 at 18:00


Multivitamins (Thera M Plus) 1 tab DAILY PO  Last administered on 11/6/18at 08:

29;  Start 11/1/18 at 18:00


Pantoprazole Sodium (Protonix) 40 mg DAILYAC PO  Last administered on 11/6/18at 

08:26;  Start 11/1/18 at 18:00


Ondansetron HCl (Zofran Odt) 8 mg PRN BID  PRN PO NAUSEA/VOMITING Last 

administered on 11/6/18at 08:52;  Start 11/1/18 at 17:30


Simvastatin (Zocor) 40 mg QHS PO  Last administered on 11/5/18at 20:24;  Start 

11/1/18 at 21:00


Info (Anti-Coagulation Monitoring By Pharmacy) 1 each PRN DAILY  PRN MC SEE 

COMMENTS Last administered on 11/4/18at 14:39;  Start 11/1/18 at 17:30


Lactobacillus Rhamnosus (Culturelle) 1 cap BID PO  Last administered on 11/1/ 18at 22:22;  Start 11/1/18 at 21:00;  Stop 11/2/18 at 08:28;  Status DC


Acetaminophen (Tylenol) 500 mg PRN Q6HRS  PRN PO MILD PAIN / TEMP;  Start 11/1/ 18 at 19:00


Tbo-Filgrastim (Granix) 480 mcg QHS SQ  Last administered on 11/4/18at 21:08;  

Start 11/2/18 at 21:00;  Stop 11/5/18 at 08:17;  Status DC


Vancomycin HCl (Vanco Per Pharmacy) 1 each PRN DAILY  PRN MC SEE COMMENTS Last 

administered on 11/5/18at 00:04;  Start 11/2/18 at 15:45;  Stop 11/6/18 at 09:43

;  Status DC


Cefepime HCl 1 gm/ Dextrose 50 ml @  100 mls/hr Q8HRS IV ;  Start 11/2/18 at 22:

00;  Status UNV


Micafungin Sodium 100 mg/Dextrose 100 ml @  100 mls/hr Q24H IV  Last 

administered on 11/5/18at 19:41;  Start 11/2/18 at 17:00


Cefepime HCl (Maxipime) 1 gm Q8HRS IVP  Last administered on 11/6/18at 05:33;  

Start 11/2/18 at 16:30;  Stop 11/6/18 at 09:43;  Status DC


Vancomycin HCl 2 gm/Sodium Chloride 500 ml @  250 mls/hr 1X  ONCE IV  Last 

administered on 11/2/18at 19:36;  Start 11/2/18 at 16:30;  Stop 11/2/18 at 18:29

;  Status DC


Vancomycin HCl 1.5 gm/Sodium Chloride 500 ml @  250 mls/hr Q24H IV  Last 

administered on 11/4/18at 21:07;  Start 11/3/18 at 19:30;  Stop 11/4/18 at 23:53

;  Status DC


Vancomycin HCl (Vancomycin Trough Level) 1 each 1X  ONCE MC  Last administered 

on 11/4/18at 19:00;  Start 11/4/18 at 19:00;  Stop 11/4/18 at 19:01;  Status DC


Vancomycin HCl (Vancomycin Oral Solution) 125 mg [id number] PO  Last administered 

on 11/4/18at 10:13;  Start 11/3/18 at 13:00;  Stop 11/4/18 at 10:42;  Status DC


Dextrose (Dextrose 50%-Water Syringe) 12.5 gm PRN Q15MIN  PRN IV SEE COMMENTS;  

Start 11/3/18 at 17:00


Citalopram Hydrobromide (CeleXA) 10 mg QHS PO  Last administered on 11/5/18at 20

:24;  Start 11/3/18 at 21:00


Vancomycin HCl 1.5 gm/Sodium Chloride 500 ml @  250 mls/hr Q18H IV  Last 

administered on 11/5/18at 15:40;  Start 11/5/18 at 15:30;  Stop 11/6/18 at 09:42

;  Status DC


Vancomycin HCl (Vancomycin Trough Level) 1 each 1X  ONCE MC  Last administered 

on 11/6/18at 08:48;  Start 11/6/18 at 09:00;  Stop 11/6/18 at 09:01;  Status DC


Iohexol (Omnipaque 240 Mg/ml) 50 ml 1X  ONCE PO ;  Start 11/5/18 at 15:00;  

Stop 11/5/18 at 15:08;  Status DC


Iohexol (Omnipaque 240 Mg/ml) 50 ml 1X  ONCE PO ;  Start 11/5/18 at 15:00;  

Stop 11/5/18 at 15:08;  Status DC


Info (CONTRAST GIVEN -- Rx MONITORING) 1 each PRN DAILY  PRN MC SEE COMMENTS;  

Start 11/5/18 at 15:15;  Stop 11/7/18 at 15:14


Linezolid/Dextrose 300 ml @  300 mls/hr Q12HR IV  Last administered on 11/6/ 18at 11:00;  Start 11/6/18 at 10:00





Active Scripts


Active


Reported


Famciclovir 500 Mg Tablet 500 Mg PO TID 1 Days


Ciprofloxacin Hcl 250 Mg Tablet 1 Tab PO BID


Zofran (Ondansetron Hcl) 8 Mg Tablet 8 Mg PO BID PRN


Xarelto (Rivaroxaban) 15 Mg Tablet 15 Mg PO DAILY


Carvedilol 3.125 Mg Tablet 3.125 Mg PO BID


Methotrexate (Methotrexate Sodium) 2.5 Mg Tablet 6 Tab PO WEEKLY


Benadryl (Diphenhydramine Hcl) 25 Mg Capsule 2 Cap PO QHS


Bumetanide 2 Mg Tablet 2 Mg PO DAILY


Ocuvite Tablet (Vit A,C & E/Lutein/Minerals) 1 Each Tablet 1 Each PO BID


Citalopram Hbr (Citalopram Hydrobromide) 10 Mg Tablet 10 Mg PO DAILY


Alprazolam 0.25 Mg Tablet 0.25 Mg PO PRN Q6HRS PRN


Isosorbide Mononitrate Er (Isosorbide Mononitrate) 30 Mg Tab.er.24h 30 Mg PO 

DAILY


Prilosec Otc (Omeprazole Magnesium) 20 Mg Tablet.dr 20 Mg PO DAILY


Novolog Flexpen (Insulin Aspart) 100 Unit/1 Ml Insuln.pen 4 Unit SQ TIDBFRMEAL


Lantus Solostar (Insulin Glargine,Hum.rec.anlog) 100 Unit/1 Ml Insuln.pen 17 

Unit SQ DAILYWBKFT


Vitamin D-3 (Cholecalciferol (Vitamin D3)) 2,000 Unit Capsule 1,000 Unit PO 

DAILY


Lisinopril 10 Mg Tablet 10 Mg PO DAILY


Simvastatin 40 Mg Tablet 40 Mg PO DAILY


Colace (Docusate Sodium) 100 Mg Capsule 100 Mg PO DAILY


Garlic 1,000 Mg Capsule 1,000 Mg PO DAILY


Multivitamins (Multivitamin) 1 Each Capsule 1 Each PO DAILY


Vitals/I & O





Vital Sign - Last 24 Hours








 11/5/18 11/5/18 11/5/18 11/5/18





 15:00 17:32 19:00 20:03


 


Temp 98.4  98.1 





 98.4  98.1 


 


Pulse 94 94 94 


 


Resp 20  16 


 


B/P (MAP) 122/46 (71) 122/46 100/58 (72) 


 


Pulse Ox 90  94 


 


O2 Delivery Room Air  Room Air Room Air


 


    





    





 11/5/18 11/6/18 11/6/18 11/6/18





 23:00 03:00 07:00 08:47


 


Temp 98.3 98.3 100.0 





 98.3 98.3 100.0 


 


Pulse 97 94 100 100


 


Resp 17 16 20 


 


B/P (MAP) 116/46 (69) 136/54 (81) 118/54 (75) 118/54


 


Pulse Ox 96 94 93 


 


O2 Delivery Room Air Room Air Room Air 


 


    





    





 11/6/18 11/6/18  





 08:47 08:48  


 


Pulse 100 100  


 


B/P (MAP) 118/54 118/54  














Intake and Output   


 


 11/5/18 11/5/18 11/6/18





 15:00 23:00 07:00


 


Intake Total 600 ml 520 ml 1150 ml


 


Output Total   200 ml


 


Balance 600 ml 520 ml 950 ml

















HERBERTH STEWART MD Nov 6, 2018 11:46

## 2018-11-06 NOTE — PN
DATE:  11/06/2018



LOCATION:  She is in room 550.



SUBJECTIVE:  The patient is awake and alert, has just finished taking a shower

and feels better.  She does state that her stooling is slowing down.



OBJECTIVE:  Vital signs are stable.  She is afebrile.



LABORATORY DATA:  Sugars are good.  Again, white count yesterday was up to

20,000 with stimulation from her admission leukopenia.  Final urine culture is

growing out 25-50 thousand of colony-forming units per mL of MRSA and ID is

dealing with the same.  She did have CT scan of abdomen and pelvis yesterday

showing no changes from prior scanning with some thickening of the distal

esophagus or from her known adenocarcinoma with a subtle hypodense liver mass

that has not significantly changed and a few subcentimeter pulmonary nodules

that were unchanged.  I can find no stool culture results available yet with the

only stool being negative so far was for C. diff.



IMPRESSION:

1.  Neutropenic fever, improved.

2.  Neutropenia, resolved.

3.  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

4.  Rheumatoid arthritis, on methotrexate.

5.  Atrial fibrillation.

6.  History of cerebrovascular accident.

7.  Anemia.

8.  Diarrhea, which is finally starting to improve.



PLAN:  Continue present antibiotics per ID.  Await final stool studies.  I am

going to continue to hold on anything to slow down stools until final culture

results of everything is back.

 



______________________________

MARSHALL RICHARDS MD



DR:  VENKATA/tu  JOB#:  [id number] / [id number]

DD:  11/06/2018 08:51  DT:  11/06/2018 21:26

## 2018-11-07 NOTE — PN
DATE:  11/07/2018



LOCATION:  She is in room 550.



SUBJECTIVE:  The patient is awake, alert, still having frequent diarrhea and

worse than yesterday morning when I talked to her.  She feels overall worn out

and tired.



OBJECTIVE:

VITAL SIGNS:  Stable.  She is afebrile.

CHEST:  Clear.

HEART:  Irregular.

ABDOMEN:  Benign.



LABORATORY DATA:  White count is 8500, hemoglobin 8.9.  Potassium 3.4.  ID is

recommending ongoing IV antibiotics as late as yesterday and these are ongoing

currently.



Stool cultures finally reported on and there is no growth.  I am going to add

some Lomotil to her regimen today and otherwise we will continue the same.



IMPRESSION:

1.  Diarrhea.

2.  Leukopenia, improved.

3.  Neutropenic fever, on broad spectrum antibiotics, improved.

4.  Adenocarcinoma of the esophagus, undergoing chemoradiation.



PLAN:  As noted above.  Will need skilled nursing on discharge, but will need to

be done with IV antibiotics prior to the same.

 



______________________________

MARSHALL RICHARDS MD



DR:  [email or web address]  JOB#:  [id number] / [id number]

DD:  11/07/2018 08:53  DT:  11/07/2018 21:43

## 2018-11-07 NOTE — PDOC
Infectious Disease Note


Subjective


Subjective


Had loose stool this am and felt sweats


Still Appetite diminished,


Hurts to swallow


Less sacral pain/burning 


No F/C/S/SOA





Vital Sign


Vital Signs





Vital Signs








  Date Time  Temp Pulse Resp B/P (MAP) Pulse Ox O2 Delivery O2 Flow Rate FiO2


 


11/7/18 03:00 97.8 91 18 129/45 (73) 95 Room Air  





 97.8       











Physical Exam


PHYSICAL EXAM


GENERAL:  Propped up in chair, alert, relaxed appearance 


HEENT:  Oral cavity, pharynx is pink and moist.  


NECK:  Supple.


LUNGS:  Diminished aeration.


HEART:  S1 and S2.


ABDOMEN:  Obese, bowel sounds hyperactive, soft, nontender.


EXTREMITIES:  No gross edema or cyanosis.


SKIN:  Warm.  Localized rash over a sacral region - better 


NEUROLOGIC:  Alert and oriented x 3. 


Left-sided Port-A-Cath site without redness, swelling





Labs


Lab





Laboratory Tests








Test


 11/6/18


11:20 11/6/18


16:37 11/6/18


20:50 11/7/18


06:40


 


Glucose (Fingerstick)


 151 mg/dL


(70-99) 169 mg/dL


(70-99) 164 mg/dL


(70-99) 





 


White Blood Count


 


 


 


 8.5 x10^3/uL


(4.0-11.0)


 


Red Blood Count


 


 


 


 3.07 x10^6/uL


(3.50-5.40)


 


Hemoglobin


 


 


 


 8.9 g/dL


(12.0-15.5)


 


Hematocrit


 


 


 


 26.1 %


(36.0-47.0)


 


Mean Corpuscular Volume    85 fL () 


 


Mean Corpuscular Hemoglobin    29 pg (25-35) 


 


Mean Corpuscular Hemoglobin


Concent 


 


 


 34 g/dL


(31-37)


 


Red Cell Distribution Width


 


 


 


 20.5 %


(11.5-14.5)


 


Platelet Count


 


 


 


 203 x10^3/uL


(140-400)


 


Neutrophils (%) (Auto)    82 % (31-73) 


 


Lymphocytes (%) (Auto)    6 % (24-48) 


 


Monocytes (%) (Auto)    11 % (0-9) 


 


Eosinophils (%) (Auto)    0 % (0-3) 


 


Basophils (%) (Auto)    0 % (0-3) 


 


Neutrophils # (Auto)


 


 


 


 7.0 x10^3uL


(1.8-7.7)


 


Lymphocytes # (Auto)


 


 


 


 0.5 x10^3/uL


(1.0-4.8)


 


Monocytes # (Auto)


 


 


 


 0.9 x10^3/uL


(0.0-1.1)


 


Eosinophils # (Auto)


 


 


 


 0.0 x10^3/uL


(0.0-0.7)


 


Basophils # (Auto)


 


 


 


 0.0 x10^3/uL


(0.0-0.2)


 


Sodium Level


 


 


 


 137 mmol/L


(136-145)


 


Potassium Level


 


 


 


 3.4 mmol/L


(3.5-5.1)


 


Chloride Level


 


 


 


 101 mmol/L


()


 


Carbon Dioxide Level


 


 


 


 28 mmol/L


(21-32)


 


Anion Gap    8 (6-14) 


 


Blood Urea Nitrogen    6 mg/dL (7-20) 


 


Creatinine


 


 


 


 1.0 mg/dL


(0.6-1.0)


 


Estimated GFR


(Cockcroft-Gault) 


 


 


 53.0 





 


Glucose Level


 


 


 


 99 mg/dL


(70-99)


 


Calcium Level


 


 


 


 7.1 mg/dL


(8.5-10.1)








Micro





Left chest Port-A-Cath. Right mastectomy. Right axillary surgical clips. 


Coronary artery disease, median sternotomy wires, prior CABG. Pulmonary 


trunk is dilated suggestive of pulmonary arterial hypertension. 


Mediastinal lymph nodes, no adenopathy. Cardiomegaly. No pericardial 


effusion.


 


No pleural effusion. Central airways are patent. Respiratory motion 


artifact. A few subcentimeter lung nodules are unchanged, for example 


image 25 in the right lung. Mild atelectasis or scarring in the lower 


lobes. Right middle lobe calcified granuloma.


 


Unchanged thickening of the distal esophagus.


 


Cholecystectomy. Subtle hypodense liver masses are similar to prior study,


much more conspicuous on PET. Calcified granulomas in the spleen. 


Pancreas, adrenal glands, and abdominal aorta caliber are normal. Moderate


atherosclerotic calcification of the abdominal aorta.


 


No hydronephrosis. Right extrarenal pelvis.


 


Stomach unremarkable. No small bowel obstruction. There is no colon wall 


thickening.


 


Urinary bladder is normal. Atrophic uterus. No pelvic free fluid.


 


Degenerative spondylosis of the lumbar spine. Grade 1 anterolisthesis of 


L4 on L5 and L5 on S1. Grade 1 retrolisthesis of L3 on L4 and L2 on L3.


 


IMPRESSION:


1.  Unchanged thickening of the distal esophagus.


2.  Subtle hypodense liver masses are not significantly changed.


3.  There are a few subcentimeter pulmonary nodules that are unchanged.








Microbiology


11/3/18 Blood Culture - Preliminary, Resulted


          NO GROWTH AFTER 3 DAYS


11/1/18 Urine Culture - Final, Complete


          


11/1/18 Urine Culture Result 1 (HARRY) - Final, Complete


          


11/1/18 Antimicrobic Susceptibility - Final, Complete





Objective


Assessment


Fever - resolved - back ? Vanc


MRSA in urine - on Zyvox possible contamination with squamous cells in UA/blood 

cults neg. CT without gross finding although without IV contrast


Leukopenia. s/p Granix 11/2 - improved


Immunosuppression s/p chemoradiation


Diarrhea. C.diff neg 11/3 - ? Sec to chemo radiation. w/u for infection neg


Herpes Zoster. 


   - Finished 7-day Famciclovir OP recently.


Pyuria, ? UTI - on cipro OP per PCP- MRSA on cult - ? contamination given neg 

nitrate/LE and + squamous cells


Dysphagia 


Esophageal cancer, stage II s/p chemoradiation 


  -Diagnosed in August 2018


  -s/p Port placement on 9/17/2018


RA - on methotrexate  


A-fib, Xarelto 


DM w/ hypoglycemic episode of 60 


CAD


h/o stroke 


Mild CHF on cxr


MRSA in urine, 11/1





Plan


Plan of Care


BC NGTD 


D/c Zyvox/Micafungin


May need Gi eval


Monitor labs in am /temp and renal function closely


Supportive care 


D/w MEÑO DOVER MD Nov 7, 2018 08:49

## 2018-11-07 NOTE — EKG
[facility]

              [address] [place] [address]

Test Date:    2018               Test Time:    16:23:06

Pat Name:     MONICA SOOD           Department:   

Patient ID:   [id number]           Room:         [facility]

Gender:       F                        Technician:   

:          [id number]               Requested By: MARSHALL RICHARDS

Order Number: [id number]           Reading MD:   Hugh Rogers MD

                                 Measurements

Intervals                              Axis          

Rate:         85                       P:            

TN:                                    QRS:          30

QRSD:         94                       T:            -154

QT:           368                                    

QTc:          443                                    

                           Interpretive Statements

ATRIAL FIBRILLATION WITH CONTROLLED VENTRICULAR RESPONSE

NON-SPECIFIC ST/T CHANGES

Electronically Signed On 2018 17:59:50 CST by Hugh Rogers MD

## 2018-11-08 NOTE — PDOC2
CARDIAC CONSULT


DATE OF CONSULT


Date of Consult


DATE: 11/8/18 


TIME: 09:45





REASON FOR CONSULT


Reason for Consult:


Chest pressure, abnormal EKG





REFERRING PHYSICIAN


Referring Physician:


Appl





SOURCE


Source:  Chart review, Patient





HISTORY OF PRESENT ILLNESS


HISTORY OF PRESENT ILLNESS


This is a pleasant 84 yo female admitted for complains of vomiting and 

diarrhea.  She was started on chemotherapy/radiation recently for her 

esophageal CA but chemo discontinued for now due to intolerance and toxicity. 

There was no signs of bleed nor SOA but has been noted with AFIB RVR and chest 

pain last night. Her chest pain is more towards the epigastric/lower sternal 

region that radiates to immediate back and it was hurting when she took deep 

breaths. Today the pain is gone.  There was no associated SOA nor nausea at 

that time. She also was noted with shingles and so far pain has been controlled 

in regards to this.





PAST MEDICAL HISTORY


Past Medical History


Cardiovascular:  CAD, HTN, Hyperlipidemia


CENTRAL NERVOUS SYSTEM:  CVA, TIA


GI:  GERD


Heme/Onc:  Cancer (breast), Stage 1 esophageal adenocarcinoma noted 8/2018 and 

on chemoradiation


Musculoskeletal:  Osteoarthritis


Rheumatologic:  Rheumatoid arthritis


Endocrine:  Diabetes





PAST SURGICAL HISTORY


Past Surgical History


CABG (details unknown ), Total knee replacement (bilateral ), left chest 

portacath





SOCIAL HISTORY


Smoke:  Quit


ALCOHOL:  none


Drugs:  None


Lives:  with Family





ALLERGIES


ALLERGIES:  


Coded Allergies:  


     milk (Verified  Allergy, Intermediate, 8/9/18)


     montelukast (Verified  Allergy, Intermediate, 8/9/18)


     I S O L A T I O N *CONTACT* (Verified  Allergy, Unknown, 11/5/18)


 


 mrsa





ROS


Review of System


14 point ROS evaluated with pertinent positives noted per HPI





PHYSICAL EXAM


General:  Alert, Oriented X3, Cooperative, No acute distress


HEENT:  Atraumatic, Mucous membr. moist/pink


Lungs:  Other (diminished bases)


Heart:  Other (AFIB; 3/6 diffuse systolic murmur)


Abdomen:  Soft, No tenderness, Other (obese)


Extremities:  No cyanosis, Other (2+ bilateral LE pitting edema)


Skin:  No breakdown, No significant lesion


Neuro:  Normal speech, Sensation intact


Psych/Mental Status:  Mental status NL, Mood NL


MUSCULOSKELETAL:  Osteoarthritic changes both hands





VITALS


VITALS





Vital Signs








  Date Time  Temp Pulse Resp B/P (MAP) Pulse Ox O2 Delivery O2 Flow Rate FiO2


 


11/8/18 07:00 98.5 103 20 130/55 (80) 94 Room Air  





 98.5       











LABS


Lab:





Laboratory Tests








Test


 11/7/18


11:34 11/7/18


16:46 11/7/18


20:48 11/8/18


07:51


 


Glucose (Fingerstick)


 119 mg/dL


(70-99) 106 mg/dL


(70-99) 140 mg/dL


(70-99) 98 mg/dL


(70-99)











ECHOCARDIOGRAM


ECHOCARDIOGRAM





<Conclusion>


Left ventricle systolic function is moderately to severely impaired. The 

Ejection Fraction is 30-35%.


Septal motion consistent with post-operative state. Moderate to severe global 

hypokinesis.


The right ventricular systolic function is mild diminished.


Doppler and Color Flow revealed moderate aortic regurgitation.


Doppler and Color-flow revealed moderate mitral regurgitation.


Doppler and Color Flow revealed moderate tricuspid regurgitation.The PA 

pressure was estimated at 53 mmHg.





DATE:     08/06/18 1926





ASSESSMENT/PLAN


ASSESSMENT/PLAN


1. Atypical CP: Doubt ACS. more in the epigastric region likely from CA/

esophageal erosion with recent vomiting and radiation. 


2. Chronic AFIB RVR: in the setting of shingles and GI symptoms. Rate at 


3. Stage 1C esophageal adenocarcinoma: was on chemo discontinued due to toxicity


4. Shingles: left chest and buttock lesions


5. Recent Hx of CVA


6. NICM/ICM: Recent EF at 30-35%, clinically compensated


7. Hx of CAD; S/P CABG x4 in 2009


8. Hx of anemia with GI bleed/possible AVM


9. DM2HLP


10. HTN: controlled





Recommendations


1. Hgb stable in the 8s. Continue on current xarelto for stroke prevention if 

no signs of bleed. 


2. Maintain hydration adequacy if GI symptoms are better. Continue with 

diuretic therapy. 


3. Pain control per PCP. Will change coreg to toprol XL 50 mg moving forward 

for better HR control. 


4. Presently she is not an ideal candidate for ICD given her significant 

multiple comorbid conditions including CA. 


5. Will need to ascertain prognosis/life expectancy in regards to her 

esophageal CA for LAAO consideration


6. OK for SNU transfer. Follow up in office on 12/14.











LEWIS GUTIERREZ Nov 8, 2018 10:12

## 2018-11-08 NOTE — DS
DATE OF DISCHARGE:  11/08/2018



PRIMARY DIAGNOSIS:  Neutropenic fever.



ADDITIONAL DIAGNOSES:  Methicillin-resistant Staphylococcus aureus in the urine;

leukopenia; immunosuppression, undergoing chemoradiation for esophageal cancer;

diarrhea with Clostridium difficile and cultures negative during the stay;

herpes zoster, finishing 7-day of antiviral therapy approximately at the time of

admission; dysphagia; esophageal cancer; rheumatoid arthritis, on methotrexate;

atrial fibrillation, on Xarelto; diabetes; coronary artery disease; history of

heart failure; history of cerebrovascular accident; dehydration.



CHIEF COMPLAINT AND HISTORY OF PRESENT ILLNESS:  This is an 83-year-old white

female, who was admitted through the office on the day of admission with

intractable vomiting and diarrhea while undergoing chemoradiation for esophagus

with dehydration.



SUMMARY OF STAY:  The patient was admitted, hydrated, was found to be profoundly

neutropenic on admission with white count of 1800, ANC of around 900 and ran

some fevers.  She was started on broad spectrum antibiotics with cultures

including blood culture, stool, etc. being negative with the exception of

25-50,000 colony-forming units per mL of MRSA in her urine.  ID followed along. 

She was treated with broad spectrum antibiotics.  Oncology following along gave

her white cell stimulating agents with recovery.  During the stay of her white

count, hemoglobin 9.4 on admission and 8.9 on discharge.  She did feel better. 

She was finally started on some Lomotil for the diarrhea and it was decreasing. 

She was quite weak and felt that she needed skilled nursing for rehab following

this and this was accomplished on the day of discharge.



DISPOSITION:  The patient is discharged to skilled nursing, on an ADA diet,

activity as tolerated with PT and OT to evaluate and treat.  Meds have been

addressed and are on the med rec.

 



______________________________

MARSHALL RICHARDS MD



DR:  [email or web address]  JOB#:  [id number] / [id number]

DD:  11/08/2018 18:10  DT:  11/08/2018 19:43

## 2018-11-08 NOTE — DISCH
DISCHARGE


DISCHARGE INFORMATION:


DISCHARGE DATE:  Nov 8, 2018


CONDITION ON DISCHARGE:  Stable





CODE STATUS:


Code Status:  Full





SKILLED NURSING:


SNF STAY <30 DAYS:  Yes





POST DISCHARGE ORDERS:


ACTIVITY ORDERS:  No restrictions, Resume previous activity, Activity as 

tolerated


WEIGHT BEARING STATUS:  No restrictions


DIET AFTER DISCHARGE:  ADA


WOUND/INCISION CARE:  Keep wound/cast CDI





CHECKS AFTER DISCHARGE:


CHECKS AFTER DISCHARGE:  Check blood press - daily, Check blood sugar, ac/hs, 

Check your Temp as needed





TREATMENT/EQUIPMENT ORDERS:


Physical Therapy For:  Evalulation/Treatment


Occupational Therapy For:  Evaluation/Treatment





DISCHARGE MEDICATIONS:


Home Meds


Active Scripts


Diphenoxylate Hcl/Atropine (DIPHENOXYLATE-ATROPINE TABLET) 1 Each Tablet, 1 TAB 

PO PRN QID PRN for DIARRHEA for 30 Days, TAB


   Prov:MARSHALL RICHARDS MD         11/8/18


Acetaminophen (MAPAP) 500 Mg Tablet, 500 MG PO PRN Q6HRS PRN for MILD PAIN / 

TEMP for 30 Days, TAB


   Prov:MARSHALL RICHARDS MD         11/8/18


Reported Medications


Famciclovir (FAMCICLOVIR) 500 Mg Tablet, 500 MG PO TID for Shingles for 1 Day, #

3 TAB


   11/1/18


Ciprofloxacin Hcl (CIPROFLOXACIN HCL) 250 Mg Tablet, 1 TAB PO BID for UTI, #10 

TAB


   11/1/18


Ondansetron Hcl (ZOFRAN) 8 Mg Tablet, 8 MG PO BID PRN for NAUSEA/VOMITING


   10/1/18


Rivaroxaban (XARELTO) 15 Mg Tablet, 15 MG PO DAILY


   9/17/18


Carvedilol (CARVEDILOL) 3.125 Mg Tablet, 3.125 MG PO BID, TAB


   8/3/18


Methotrexate Sodium (METHOTREXATE) 2.5 Mg Tablet, 6 TAB PO WEEKLY


   8/3/18


Diphenhydramine Hcl (BENADRYL) 25 Mg Capsule, 2 CAP PO QHS


   8/3/18


Bumetanide (BUMETANIDE) 2 Mg Tablet, 2 MG PO DAILY, TAB


   8/3/18


Vit A,C & E/Lutein/Minerals (OCUVITE TABLET) 1 Each Tablet, 1 EACH PO BID, TAB


   8/3/18


Citalopram Hydrobromide (CITALOPRAM HBR) 10 Mg Tablet, 10 MG PO DAILY, TAB


   8/3/18


Alprazolam (ALPRAZOLAM) 0.25 Mg Tablet, 0.25 MG PO PRN Q6HRS PRN for ANXIETY / 

AGITATION


   8/3/18


Isosorbide Mononitrate (ISOSORBIDE MONONITRATE ER) 30 Mg Tab.er.24h, 30 MG PO 

DAILY, TAB.SR


   8/3/18


Omeprazole Magnesium (PRILOSEC OTC) 20 Mg Tablet.dr, 20 MG PO DAILY, TAB


   8/3/18


Insulin Aspart (NOVOLOG FLEXPEN) 100 Unit/1 Ml Insuln.pen, 4 UNIT SQ TIDBFRMEAL


   8/3/18


Insulin Glargine,Hum.rec.anlog (LANTUS SOLOSTAR) 100 Unit/1 Ml Insuln.pen, 17 

UNIT SQ DAILYWBKFT


   8/3/18


Cholecalciferol (Vitamin D3) (VITAMIN D-3) 2,000 Unit Capsule, 1000 UNIT PO 

DAILY


   8/3/18


Lisinopril (LISINOPRIL) 10 Mg Tablet, 10 MG PO DAILY


   8/3/18


Simvastatin (SIMVASTATIN) 40 Mg Tablet, 40 MG PO DAILY


   8/3/18


Docusate Sodium (COLACE) 100 Mg Capsule, 100 MG PO DAILY, CAP


   8/3/18


Garlic (GARLIC) 1,000 Mg Capsule, 1000 MG PO DAILY


   8/3/18


Multivitamin (MULTIVITAMINS) 1 Each Capsule, 1 EACH PO DAILY


   8/3/18











MARSHALL RICHARDS MD Nov 8, 2018 09:09

## 2018-11-08 NOTE — PDOC
Infectious Disease Note


Subjective


Subjective


Had loose stool this am but less


Appetite and swallow some better,


Waiting for Card f/u


Less sacral pain/burning 


No F/C/S/SOA





Vital Sign


Vital Signs





Vital Signs








  Date Time  Temp Pulse Resp B/P (MAP) Pulse Ox O2 Delivery O2 Flow Rate FiO2


 


11/8/18 07:00 98.5 103 20 130/55 (80) 94 Room Air  





 98.5       











Physical Exam


PHYSICAL EXAM


GENERAL:  Propped up in chair, alert, relaxed appearance - looks better


HEENT:  Oral cavity, pharynx is pink and moist.  


NECK:  Supple.


LUNGS:  Diminished aeration.


HEART:  S1 and S2.


ABDOMEN:  Obese, bowel sounds hyperactive, soft, nontender.


EXTREMITIES:  No gross edema or cyanosis.


SKIN:  Warm.  


NEUROLOGIC:  Alert and oriented x 3. 


Left-sided Port-A-Cath site without redness, swelling





Labs


Lab





Laboratory Tests








Test


 11/7/18


11:34 11/7/18


16:46 11/7/18


20:48 11/8/18


07:51


 


Glucose (Fingerstick)


 119 mg/dL


(70-99) 106 mg/dL


(70-99) 140 mg/dL


(70-99) 98 mg/dL


(70-99)








Micro





Left chest Port-A-Cath. Right mastectomy. Right axillary surgical clips. 


Coronary artery disease, median sternotomy wires, prior CABG. Pulmonary 


trunk is dilated suggestive of pulmonary arterial hypertension. 


Mediastinal lymph nodes, no adenopathy. Cardiomegaly. No pericardial 


effusion.


 


No pleural effusion. Central airways are patent. Respiratory motion 


artifact. A few subcentimeter lung nodules are unchanged, for example 


image 25 in the right lung. Mild atelectasis or scarring in the lower 


lobes. Right middle lobe calcified granuloma.


 


Unchanged thickening of the distal esophagus.


 


Cholecystectomy. Subtle hypodense liver masses are similar to prior study,


much more conspicuous on PET. Calcified granulomas in the spleen. 


Pancreas, adrenal glands, and abdominal aorta caliber are normal. Moderate


atherosclerotic calcification of the abdominal aorta.


 


No hydronephrosis. Right extrarenal pelvis.


 


Stomach unremarkable. No small bowel obstruction. There is no colon wall 


thickening.


 


Urinary bladder is normal. Atrophic uterus. No pelvic free fluid.


 


Degenerative spondylosis of the lumbar spine. Grade 1 anterolisthesis of 


L4 on L5 and L5 on S1. Grade 1 retrolisthesis of L3 on L4 and L2 on L3.


 


IMPRESSION:


1.  Unchanged thickening of the distal esophagus.


2.  Subtle hypodense liver masses are not significantly changed.


3.  There are a few subcentimeter pulmonary nodules that are unchanged.








Microbiology


11/3/18 Blood Culture - Preliminary, Resulted


          NO GROWTH AFTER 3 DAYS


11/1/18 Urine Culture - Final, Complete


          


11/1/18 Urine Culture Result 1 (HARRY) - Final, Complete


          


11/1/18 Antimicrobic Susceptibility - Final, Complete





Objective


Assessment


Fever - resolved - back ? Vanc


MRSA in urine - on Zyvox possible contamination with squamous cells in UA/blood 

cults neg. CT without gross finding although without IV contrast


Leukopenia. s/p Granix 11/2 - improved


Immunosuppression s/p chemoradiation


Diarrhea. C.diff neg 11/3 - ? Sec to chemo radiation. w/u for infection neg


Herpes Zoster. 


   - Finished 7-day Famciclovir OP recently.


Pyuria, ? UTI - on cipro OP per PCP- MRSA on cult - ? contamination given neg 

nitrate/LE and + squamous cells


Dysphagia 


Esophageal cancer, stage II s/p chemoradiation 


  -Diagnosed in August 2018


  -s/p Port placement on 9/17/2018


RA - on methotrexate  


A-fib, Xarelto 


DM w/ hypoglycemic episode of 60 


CAD


h/o stroke 


Mild CHF on cxr


MRSA in urine, 11/1





Plan


Plan of Care


ID to sign off





D/w MEÑO DOVER MD Nov 8, 2018 08:45

## 2018-11-08 NOTE — PDOC
PROGRESS NOTES


Subjective


Subjective


c/c - f/u of Stage 1C poorly differentiated esophageal adenocarcinoma diagnosed 

on


08/09/2018.





ROS - has diarrhea





Objective


Objective





Vital Signs








  Date Time  Temp Pulse Resp B/P (MAP) Pulse Ox O2 Delivery O2 Flow Rate FiO2


 


11/8/18 07:00 98.5 103 20 130/55 (80) 94 Room Air  





 98.5       


 


11/5/18 03:00       2.0 














Intake and Output 


 


 11/8/18





 07:00


 


Intake Total 780 ml


 


Output Total 1630 ml


 


Balance -850 ml


 


 


 


Intake Oral 780 ml


 


Output Urine Total 1250 ml


 


Stool Total 380 ml


 


# Voids 1


 


# Bowel Movements 1











Physical Exam


General:  Alert, Oriented X3, No acute distress


Neuro:  Normal speech


Psych/Mental Status:  Mental status NL





Assessment


Assessment


IMPRESSION AND PLAN:





1.  Stage 1C poorly differentiated esophageal adenocarcinoma diagnosed on


08/09/2018.  She was started on concurrent chemoradiation on 09/19/2018.  She


received her fourth dose of carboplatin and Taxol on 10/24/2018.  Chemo was then


discontinued because of toxicities.  She had nausea, vomiting and generalized


weakness.  She subsequently also developed shingles.  No further chemotherapy


planned.  I will plan for a followup CT scan in about 2 months to evaluate for


response.





2.  Neutropenia due to chemotherapy.  On Granix 480 mcg 11/2/18.


d/c on 11/5/18 as wbc 20 on 11/5/18.


WBC 8.5 on 11/7/18





3.  Fever of 101.3 on 11/01/2018.  Consulted Infectious Diseases.  Fever has now


improved.





4.  Rheumatoid arthritis.  She is on methotrexate weekly.


5.  Atrial fibrillation.  Follow up with Cardiology.


6.  History of cerebrovascular accident.


7.  Anemia.  Hemoglobin 8.9.  Continue to monitor.


8. Diarrhea - cont supportive care





Comment


Review of Relevant


I have reviewed the following items claudio (where applicable) has been applied.


Labs





Laboratory Tests








Test


 11/6/18


11:20 11/6/18


16:37 11/6/18


20:50 11/7/18


06:40


 


Glucose (Fingerstick)


 151 mg/dL


(70-99) 169 mg/dL


(70-99) 164 mg/dL


(70-99) 





 


White Blood Count


 


 


 


 8.5 x10^3/uL


(4.0-11.0)


 


Red Blood Count


 


 


 


 3.07 x10^6/uL


(3.50-5.40)


 


Hemoglobin


 


 


 


 8.9 g/dL


(12.0-15.5)


 


Hematocrit


 


 


 


 26.1 %


(36.0-47.0)


 


Mean Corpuscular Volume    85 fL () 


 


Mean Corpuscular Hemoglobin    29 pg (25-35) 


 


Mean Corpuscular Hemoglobin


Concent 


 


 


 34 g/dL


(31-37)


 


Red Cell Distribution Width


 


 


 


 20.5 %


(11.5-14.5)


 


Platelet Count


 


 


 


 203 x10^3/uL


(140-400)


 


Neutrophils (%) (Auto)    82 % (31-73) 


 


Lymphocytes (%) (Auto)    6 % (24-48) 


 


Monocytes (%) (Auto)    11 % (0-9) 


 


Eosinophils (%) (Auto)    0 % (0-3) 


 


Basophils (%) (Auto)    0 % (0-3) 


 


Neutrophils # (Auto)


 


 


 


 7.0 x10^3uL


(1.8-7.7)


 


Lymphocytes # (Auto)


 


 


 


 0.5 x10^3/uL


(1.0-4.8)


 


Monocytes # (Auto)


 


 


 


 0.9 x10^3/uL


(0.0-1.1)


 


Eosinophils # (Auto)


 


 


 


 0.0 x10^3/uL


(0.0-0.7)


 


Basophils # (Auto)


 


 


 


 0.0 x10^3/uL


(0.0-0.2)


 


Segmented Neutrophils %    66 % (35-66) 


 


Band Neutrophils %    15 % (0-9) 


 


Lymphocytes %    2 % (24-48) 


 


Monocytes %    15 % (0-10) 


 


Eosinophils %    1 % (0-5) 


 


Metamyelocytes %    1 % (0-0) 


 


Platelet Estimate


 


 


 


 Adequate


(ADEQUATE)


 


Sodium Level


 


 


 


 137 mmol/L


(136-145)


 


Potassium Level


 


 


 


 3.4 mmol/L


(3.5-5.1)


 


Chloride Level


 


 


 


 101 mmol/L


()


 


Carbon Dioxide Level


 


 


 


 28 mmol/L


(21-32)


 


Anion Gap    8 (6-14) 


 


Blood Urea Nitrogen    6 mg/dL (7-20) 


 


Creatinine


 


 


 


 1.0 mg/dL


(0.6-1.0)


 


Estimated GFR


(Cockcroft-Gault) 


 


 


 53.0 





 


Glucose Level


 


 


 


 99 mg/dL


(70-99)


 


Calcium Level


 


 


 


 7.1 mg/dL


(8.5-10.1)


 


Test


 11/7/18


08:32 11/7/18


11:34 11/7/18


16:46 11/7/18


20:48


 


Glucose (Fingerstick)


 120 mg/dL


(70-99) 119 mg/dL


(70-99) 106 mg/dL


(70-99) 140 mg/dL


(70-99)


 


Test


 11/8/18


07:51 


 


 





 


Glucose (Fingerstick)


 98 mg/dL


(70-99) 


 


 











Laboratory Tests








Test


 11/7/18


11:34 11/7/18


16:46 11/7/18


20:48 11/8/18


07:51


 


Glucose (Fingerstick)


 119 mg/dL


(70-99) 106 mg/dL


(70-99) 140 mg/dL


(70-99) 98 mg/dL


(70-99)








Microbiology


11/3/18 Blood Culture - Final, Complete


          NO GROWTH AFTER 5 DAYS


11/4/18 Stool Culture - Final, Resulted


          


11/4/18 Stool Culture Result 1 (HARRY) - Final, Resulted


          


11/4/18 Campylobacter Antigen Assay - Preliminary, Resulted


          


11/4/18 Campylobactor Result 1 - Preliminary, Resulted


          


11/4/18 Shiga Toxin Test - Final, Resulted


          


11/1/18 Urine Culture - Final, Complete


          


11/1/18 Urine Culture Result 1 (HARRY) - Final, Complete


          


11/1/18 Antimicrobic Susceptibility - Final, Complete


Medications





Current Medications


Potassium Chloride/Sodium Chloride 1,000 ml @  100 mls/hr Q10H IV  Last 

administered on 11/8/18at 00:57;  Start 11/1/18 at 17:30


Alprazolam (Xanax) 0.25 mg PRN Q6HRS  PRN PO ANXIETY / AGITATION Last 

administered on 11/7/18at 20:42;  Start 11/1/18 at 17:15


Carvedilol (Coreg) 3.125 mg BIDWMEALS PO  Last administered on 11/7/18at 17:39;

  Start 11/1/18 at 18:00


Ciprofloxacin (Cipro) 250 mg BID PO  Last administered on 11/2/18at 10:45;  

Start 11/1/18 at 21:00;  Stop 11/2/18 at 15:50;  Status DC


Citalopram Hydrobromide (CeleXA) 10 mg DAILY PO  Last administered on 11/2/18at 

10:45;  Start 11/1/18 at 18:00;  Stop 11/3/18 at 18:26;  Status DC


Diphenhydramine HCl (Benadryl) 50 mg QHS PO  Last administered on 11/7/18at 20:

43;  Start 11/1/18 at 21:00


Docusate Sodium (Colace) 100 mg DAILY PO  Last administered on 11/5/18at 08:39;

  Start 11/1/18 at 18:00


Insulin Glargine (Lantus) 17 units DAILYWBKFT SQ  Last administered on 11/7/ 18at 09:46;  Start 11/2/18 at 08:00


Isosorbide Mononitrate (Imdur) 30 mg DAILY PO  Last administered on 11/7/18at 09

:30;  Start 11/1/18 at 18:00


Lisinopril (Prinivil) 10 mg DAILY PO  Last administered on 11/7/18at 09:29;  

Start 11/1/18 at 18:00


Rivaroxaban (Xarelto) 15 mg DAILYWSUP PO  Last administered on 11/7/18at 17:38;

  Start 11/1/18 at 18:00


Multivitamins/ Minerals (I-Josias) 1 tab BID PO  Last administered on 11/7/18at 20

:43;  Start 11/1/18 at 21:00


Bumetanide (Bumex) 2 mg DAILY PO  Last administered on 11/7/18at 09:26;  Start 

11/1/18 at 18:00


Vitamin D (Vitamin D3) 1,000 unit DAILY PO  Last administered on 11/1/18at 17:53

;  Start 11/1/18 at 18:00;  Stop 11/1/18 at 19:01;  Status DC


Non-Formulary Medication 1 ea TID PO  Last administered on 11/2/18at 10:48;  

Start 11/1/18 at 21:00;  Stop 11/6/18 at 13:52;  Status DC


Insulin Human Lispro (HumaLOG) 4 units TIDWMEALS SQ  Last administered on 11/7/ 18at 12:02;  Start 11/1/18 at 18:00


Methotrexate (Rheumatrex) 15 mg WEEKLY PO  Last administered on 11/1/18at 17:55

;  Start 11/1/18 at 18:00


Multivitamins (Thera M Plus) 1 tab DAILY PO  Last administered on 11/7/18at 09:

28;  Start 11/1/18 at 18:00


Pantoprazole Sodium (Protonix) 40 mg DAILYAC PO  Last administered on 11/7/18at 

09:25;  Start 11/1/18 at 18:00


Ondansetron HCl (Zofran Odt) 8 mg PRN BID  PRN PO NAUSEA/VOMITING Last 

administered on 11/7/18at 09:28;  Start 11/1/18 at 17:30


Simvastatin (Zocor) 40 mg QHS PO  Last administered on 11/7/18at 20:43;  Start 

11/1/18 at 21:00


Info (Anti-Coagulation Monitoring By Pharmacy) 1 each PRN DAILY  PRN MC SEE 

COMMENTS Last administered on 11/7/18at 14:11;  Start 11/1/18 at 17:30


Lactobacillus Rhamnosus (Culturelle) 1 cap BID PO  Last administered on 11/1/ 18at 22:22;  Start 11/1/18 at 21:00;  Stop 11/2/18 at 08:28;  Status DC


Acetaminophen (Tylenol) 500 mg PRN Q6HRS  PRN PO MILD PAIN / TEMP Last 

administered on 11/7/18at 09:41;  Start 11/1/18 at 19:00


Tbo-Filgrastim (Granix) 480 mcg QHS SQ  Last administered on 11/4/18at 21:08;  

Start 11/2/18 at 21:00;  Stop 11/5/18 at 08:17;  Status DC


Vancomycin HCl (Vanco Per Pharmacy) 1 each PRN DAILY  PRN MC SEE COMMENTS Last 

administered on 11/5/18at 00:04;  Start 11/2/18 at 15:45;  Stop 11/6/18 at 09:43

;  Status DC


Cefepime HCl 1 gm/ Dextrose 50 ml @  100 mls/hr Q8HRS IV ;  Start 11/2/18 at 22:

00;  Status UNV


Micafungin Sodium 100 mg/Dextrose 100 ml @  100 mls/hr Q24H IV  Last 

administered on 11/7/18at 17:40;  Start 11/2/18 at 17:00


Cefepime HCl (Maxipime) 1 gm Q8HRS IVP  Last administered on 11/6/18at 05:33;  

Start 11/2/18 at 16:30;  Stop 11/6/18 at 09:43;  Status DC


Vancomycin HCl 2 gm/Sodium Chloride 500 ml @  250 mls/hr 1X  ONCE IV  Last 

administered on 11/2/18at 19:36;  Start 11/2/18 at 16:30;  Stop 11/2/18 at 18:29

;  Status DC


Vancomycin HCl 1.5 gm/Sodium Chloride 500 ml @  250 mls/hr Q24H IV  Last 

administered on 11/4/18at 21:07;  Start 11/3/18 at 19:30;  Stop 11/4/18 at 23:53

;  Status DC


Vancomycin HCl (Vancomycin Trough Level) 1 each 1X  ONCE MC  Last administered 

on 11/4/18at 19:00;  Start 11/4/18 at 19:00;  Stop 11/4/18 at 19:01;  Status DC


Vancomycin HCl (Vancomycin Oral Solution) 125 mg [id number] PO  Last administered 

on 11/4/18at 10:13;  Start 11/3/18 at 13:00;  Stop 11/4/18 at 10:42;  Status DC


Dextrose (Dextrose 50%-Water Syringe) 12.5 gm PRN Q15MIN  PRN IV SEE COMMENTS;  

Start 11/3/18 at 17:00


Citalopram Hydrobromide (CeleXA) 10 mg QHS PO  Last administered on 11/7/18at 20

:42;  Start 11/3/18 at 21:00


Vancomycin HCl 1.5 gm/Sodium Chloride 500 ml @  250 mls/hr Q18H IV  Last 

administered on 11/5/18at 15:40;  Start 11/5/18 at 15:30;  Stop 11/6/18 at 09:42

;  Status DC


Vancomycin HCl (Vancomycin Trough Level) 1 each 1X  ONCE MC  Last administered 

on 11/6/18at 08:48;  Start 11/6/18 at 09:00;  Stop 11/6/18 at 09:01;  Status DC


Iohexol (Omnipaque 240 Mg/ml) 50 ml 1X  ONCE PO ;  Start 11/5/18 at 15:00;  

Stop 11/5/18 at 15:08;  Status DC


Iohexol (Omnipaque 240 Mg/ml) 50 ml 1X  ONCE PO ;  Start 11/5/18 at 15:00;  

Stop 11/5/18 at 15:08;  Status DC


Info (CONTRAST GIVEN -- Rx MONITORING) 1 each PRN DAILY  PRN MC SEE COMMENTS;  

Start 11/5/18 at 15:15;  Stop 11/7/18 at 15:14;  Status DC


Linezolid/Dextrose 300 ml @  300 mls/hr Q12HR IV  Last administered on 11/6/ 18at 20:31;  Start 11/6/18 at 10:00;  Stop 11/7/18 at 09:52;  Status DC


Diphenoxylate HCl/ Atropine (Lomotil) 1 tab PRN QID  PRN PO DIARRHEA Last 

administered on 11/7/18at 09:24;  Start 11/7/18 at 09:00





Active Scripts


Active


Reported


Famciclovir 500 Mg Tablet 500 Mg PO TID 1 Days


Ciprofloxacin Hcl 250 Mg Tablet 1 Tab PO BID


Zofran (Ondansetron Hcl) 8 Mg Tablet 8 Mg PO BID PRN


Xarelto (Rivaroxaban) 15 Mg Tablet 15 Mg PO DAILY


Carvedilol 3.125 Mg Tablet 3.125 Mg PO BID


Methotrexate (Methotrexate Sodium) 2.5 Mg Tablet 6 Tab PO WEEKLY


Benadryl (Diphenhydramine Hcl) 25 Mg Capsule 2 Cap PO QHS


Bumetanide 2 Mg Tablet 2 Mg PO DAILY


Ocuvite Tablet (Vit A,C & E/Lutein/Minerals) 1 Each Tablet 1 Each PO BID


Citalopram Hbr (Citalopram Hydrobromide) 10 Mg Tablet 10 Mg PO DAILY


Alprazolam 0.25 Mg Tablet 0.25 Mg PO PRN Q6HRS PRN


Isosorbide Mononitrate Er (Isosorbide Mononitrate) 30 Mg Tab.er.24h 30 Mg PO 

DAILY


Prilosec Otc (Omeprazole Magnesium) 20 Mg Tablet.dr 20 Mg PO DAILY


Novolog Flexpen (Insulin Aspart) 100 Unit/1 Ml Insuln.pen 4 Unit SQ TIDBFRMEAL


Lantus Solostar (Insulin Glargine,Hum.rec.anlog) 100 Unit/1 Ml Insuln.pen 17 

Unit SQ DAILYWBKFT


Vitamin D-3 (Cholecalciferol (Vitamin D3)) 2,000 Unit Capsule 1,000 Unit PO 

DAILY


Lisinopril 10 Mg Tablet 10 Mg PO DAILY


Simvastatin 40 Mg Tablet 40 Mg PO DAILY


Colace (Docusate Sodium) 100 Mg Capsule 100 Mg PO DAILY


Garlic 1,000 Mg Capsule 1,000 Mg PO DAILY


Multivitamins (Multivitamin) 1 Each Capsule 1 Each PO DAILY


Vitals/I & O





Vital Sign - Last 24 Hours








 11/7/18 11/7/18 11/7/18 11/7/18





 09:29 09:29 09:30 11:00


 


Temp    97.9





    97.9


 


Pulse 104 104 104 94


 


Resp    24


 


B/P (MAP) 144/54 144/54 144/54 114/51 (72)


 


Pulse Ox    96


 


O2 Delivery    Room Air


 


    





    





 11/7/18 11/7/18 11/7/18 11/7/18





 15:00 17:39 19:00 20:00


 


Temp 97.9  97.8 





 97.9  97.8 


 


Pulse 20 94 93 


 


Resp 20  20 


 


B/P (MAP) 113/43 (66) 114/51 121/57 (78) 


 


Pulse Ox 98  97 


 


O2 Delivery Room Air   Room Air


 


    





    





 11/7/18 11/8/18 11/8/18 





 23:00 03:00 07:00 


 


Temp 98.7 99.2 98.5 





 98.7 99.2 98.5 


 


Pulse 97 100 103 


 


Resp 20 18 20 


 


B/P (MAP) 120/59 (79) 107/54 (71) 130/55 (80) 


 


Pulse Ox 93 91 94 


 


O2 Delivery   Room Air 














Intake and Output   


 


 11/7/18 11/7/18 11/8/18





 15:00 23:00 07:00


 


Intake Total 660 ml 120 ml 


 


Output Total 780 ml 500 ml 350 ml


 


Balance -120 ml -380 ml -350 ml











Nutrition Consultation


Dietary Evaluation:


Recommendations by RD:  Increase Calorie Intake, Protein supplementation


Comments:  


ensure tid





continue mvi


Expected Outcomes/Goals:  


to meet > 75%est nutr needs


Interpretation of weight loss:  >5% in 1 month





Malnutrition Findings:


Food and Nutrition Intake (Sev:  <50% est energy req 5days


Weight Status:  Morbidly Obese











HERBERTH STEWART MD Nov 8, 2018 08:52

## 2019-02-04 NOTE — HP
ADMIT DATE:  02/04/2019



CHIEF COMPLAINT AND HISTORY OF PRESENT ILLNESS:  This 83-year-old white female

well known to me from followup in the office.  The patient was seen in the

office on the day of admission.  She has been feeling unwell for at least a week

to 10 days.  She had a mouthful of sores and had been not taking p.o. as well. 

She also had noticed blood in her urine at least since the day prior as well as

a blood clot on the morning of admission, which was with urination in addition. 

She also had kind of a sore area in her vaginal area that she has been messing

around with for the last week or so.  In addition, last Thursday or Friday, she

was vomiting and vomited up some bile with blood and has had problems with

somewhat of a bloody nose as well as blowing a lot of stuff out in the mornings

with full of blood.  She appeared weak in the office, pale, somewhat dehydrated

and it was all the different bleeding in different areas.  It was elected to

admit her to the hospital with holding her Xarelto for some hydration as well as

better figuring out this whole picture.



PAST MEDICAL HISTORY:  Remarkable for recently in the last couple of 3 months

finishing chemoradiation for cancer of the esophagus.  She has a history of

carcinoma of the breast.  She has a history of diabetes, atherosclerotic heart

disease, anemia, postherpetic neuralgia, AFib, hypertension, depression,

hyperlipidemia, osteoporosis.



MEDICATIONS:  Brought with the patient, listed on the computer and have been

addressed.



ALLERGIES:  SHE IS ALLERGIC TO SINGULAIR.



SOCIAL HISTORY:  She is nonsmoker, nondrinker, does not use drugs.  Lives at

home alone, is  a couple of years or so ago, has a very supportive niece.



FAMILY HISTORY:  Noncontributory.



REVIEW OF SYSTEMS:  As mentioned above.



PHYSICAL EXAMINATION:

GENERAL:  She is a well-developed, well-nourished white female.  Again, appears

weak and pale.

VITAL SIGNS:  Stable.  She is afebrile in the office.

HEAD, EYE, EAR, NOSE AND THROAT:  Remarkable for pallor.  She does have

mucositis present throughout the mouth.

NECK:  Supple without lymphadenopathy or thyromegaly.

CHEST:  Clear to auscultation and percussion.

HEART:  Regular rate and rhythm without S3, S4, or murmur.

ABDOMEN:  Soft, nontender, without hepatosplenomegaly or mass.

EXTREMITIES:  Without cyanosis, clubbing, edema.  She has various bruises on her

arms which she feels is about her usual.  She does have a fairly large sized

bruise just below her left knee with hematoma, which she relates to a recent

fall.

NEUROLOGIC:  She is intact.



IMPRESSION:  Multiple areas of bleeding as outlined above in a patient with

multiple other medical problems as outlined above and dehydration.



PLAN:  The patient is admitted at this point in time for hydration.  Labs will

be checked.  ID will be asked to see her for the mucositis.  The patient will be

monitored, managed and treated appropriately.

 



______________________________

MARSHALL RICHARDS MD



DR:  VENKATA/tu  JOB#:  [id number] / [id number]

DD:  02/04/2019 19:31  DT:  02/04/2019 19:58

## 2019-02-05 NOTE — NUR
IP: Pt has a hx of mrsa in urine on 11/2/18. Pt to be in contact precautions until there are 
2 negative urine cultures 7 days apart without antibiotics.

## 2019-02-05 NOTE — CONS
DATE OF CONSULTATION:  



HOSPITAL COURSE:  This patient is an 83-year-old white female who is a 

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

patient admitted to the hospital now because of pain as well as bleeding in the

urine.  She has had menopause at the age of 54.



PHYSICAL EXAMINATION:

PELVIC:  Shows external genitalia with swelling in the urethral area, which is

painful to touch and also bleeding from the urethral area.  On bimanual exam, no

adnexal masses are felt.

EXTREMITIES:  No edema of feet.



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

lump in the clitoris area.  Recommendation would be for urologist to see the

patient and also would recommend some antibiotics at this time.  The patient

probably needs evaluation by the urologist and further treatment.



Thank you for giving me the opportunity to participate in the care and

management of this patient.

 



______________________________

CAMILO HESS MD



DR:  MICHAEL/tu  JOB#:  [id number] / [id number]

DD:  2019 08:23  DT:  2019 09:20

## 2019-02-05 NOTE — NUR
SW following pt for anticipated dc needs. Chart reviewed. Pt lives at home alone and is on 
Room Air. No PT/OT evaluation at this time. Rehab Screen pending. ID following pt. SW will 
continue to follow pt to assess needs.

## 2019-02-05 NOTE — PDOC
Infectious Disease Note


Vital Sign


Vital Signs





Vital Signs








  Date Time  Temp Pulse Resp B/P (MAP) Pulse Ox O2 Delivery O2 Flow Rate FiO2


 


2/5/19 08:41  95  129/40    


 


2/5/19 07:25 97.4  18  97 Room Air  





 97.4       











Labs


Lab





Laboratory Tests








Test


 2/4/19


18:15 2/4/19


18:20 2/4/19


21:12 2/5/19


07:40


 


Urine Collection Type Unknown    


 


Urine Color Red    


 


Urine Clarity Bloody    


 


Urine pH 5.5    


 


Urine Specific Gravity 1.020    


 


Urine Protein


 100 mg/dL


(NEG-TRACE) 


 


 





 


Urine Glucose (UA)


 Negative mg/dL


(NEG) 


 


 





 


Urine Ketones (Stick) 15 mg/dL (NEG)    


 


Urine Blood Large (NEG)    


 


Urine Nitrite Negative (NEG)    


 


Urine Bilirubin Negative (NEG)    


 


Urine Urobilinogen Dipstick


 1.0 mg/dL (0.2


mg/dL) 


 


 





 


Urine Leukocyte Esterase Large (NEG)    


 


Urine RBC


 Tntc /HPF


(0-2) 


 


 





 


Urine WBC


 Tntc /HPF


(0-4) 


 


 





 


Urine Squamous Epithelial


Cells Occ /LPF 


 


 


 





 


Urine Bacteria


 Many /HPF


(0-FEW) 


 


 





 


Urine Hyaline Casts


 Occasional


/HPF 


 


 





 


Urine Mucus Mod /LPF    


 


White Blood Count


 


 3.6 x10^3/uL


(4.0-11.0) 


 





 


Red Blood Count


 


 2.62 x10^6/uL


(3.50-5.40) 


 





 


Hemoglobin


 


 8.9 g/dL


(12.0-15.5) 


 





 


Hematocrit


 


 26.5 %


(36.0-47.0) 


 





 


Mean Corpuscular Volume


 


 101 fL


() 


 





 


Mean Corpuscular Hemoglobin  34 pg (25-35)   


 


Mean Corpuscular Hemoglobin


Concent 


 34 g/dL


(31-37) 


 





 


Red Cell Distribution Width


 


 24.4 %


(11.5-14.5) 


 





 


Platelet Count


 


 160 x10^3/uL


(140-400) 


 





 


Neutrophils (%) (Auto)  89 % (31-73)   


 


Lymphocytes (%) (Auto)  9 % (24-48)   


 


Monocytes (%) (Auto)  1 % (0-9)   


 


Eosinophils (%) (Auto)  1 % (0-3)   


 


Basophils (%) (Auto)  1 % (0-3)   


 


Neutrophils # (Auto)


 


 3.3 x10^3uL


(1.8-7.7) 


 





 


Lymphocytes # (Auto)


 


 0.3 x10^3/uL


(1.0-4.8) 


 





 


Monocytes # (Auto)


 


 0.0 x10^3/uL


(0.0-1.1) 


 





 


Eosinophils # (Auto)


 


 0.0 x10^3/uL


(0.0-0.7) 


 





 


Basophils # (Auto)


 


 0.0 x10^3/uL


(0.0-0.2) 


 





 


Segmented Neutrophils %  95 % (35-66)   


 


Lymphocytes %  4 % (24-48)   


 


Basophils %  1 % (0-3)   


 


Platelet Estimate


 


 Adequate


(ADEQUATE) 


 





 


Polychromasia  Slight   


 


Anisocytosis  Mod   


 


Schistocytes  Occ   


 


Prothrombin Time


 


 28.9 SEC


(11.7-14.0) 


 





 


Prothromb Time International


Ratio 


 2.7 (0.8-1.1) 


 


 





 


Activated Partial


Thromboplast Time 


 42 SEC (24-38) 


 


 





 


Sodium Level


 


 139 mmol/L


(136-145) 


 





 


Potassium Level


 


 3.4 mmol/L


(3.5-5.1) 


 





 


Chloride Level


 


 104 mmol/L


() 


 





 


Carbon Dioxide Level


 


 28 mmol/L


(21-32) 


 





 


Anion Gap  7 (6-14)   


 


Blood Urea Nitrogen


 


 17 mg/dL


(7-20) 


 





 


Creatinine


 


 0.8 mg/dL


(0.6-1.0) 


 





 


Estimated GFR


(Cockcroft-Gault) 


 68.5 


 


 





 


BUN/Creatinine Ratio  21 (6-20)   


 


Glucose Level


 


 137 mg/dL


(70-99) 


 





 


Calcium Level


 


 8.8 mg/dL


(8.5-10.1) 


 





 


Total Bilirubin


 


 1.1 mg/dL


(0.2-1.0) 


 





 


Aspartate Amino Transf


(AST/SGOT) 


 187 U/L


(15-37) 


 





 


Alanine Aminotransferase


(ALT/SGPT) 


 207 U/L


(14-59) 


 





 


Alkaline Phosphatase


 


 160 U/L


() 


 





 


Total Protein


 


 6.4 g/dL


(6.4-8.2) 


 





 


Albumin


 


 2.6 g/dL


(3.4-5.0) 


 





 


Albumin/Globulin Ratio  0.7 (1.0-1.7)   


 


Glucose (Fingerstick)


 


 


 125 mg/dL


(70-99) 65 mg/dL


(70-99)


 


Test


 2/5/19


08:02 


 


 





 


Glucose (Fingerstick)


 77 mg/dL


(70-99) 


 


 














Objective


Assessment


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


Hematuria, ? from Methotrexate vs other


H/O Esophageal ca s/p chemo radiation


RA


H/O Lung ca


Dehydration


Elevated LFT sec to Methotrexate





Plan


Plan of Care


stop Methotrexate


Leukovorin


fluids


change rocephine to unasyn











WAI TESFAYE MD Feb 5, 2019 11:04

## 2019-02-05 NOTE — CONS
DATE OF CONSULTATION:  



REQUESTING PHYSICIAN:  Dr. Bernardo.



REASON FOR CONSULTATION:  Extensive mouth ulcerations.



HISTORY OF PRESENT ILLNESS:  This is an 83-year-old  female who is

admitted by Dr. Bernardo from the office for unable to eat, dehydration, also

hematuria other than ulcers in the mouth.  The patient has history of esophageal

cancer who has had chemoradiation in October, I believe she finished.  The

patient also has history of breast cancer and multiple other medical problems. 

The patient denies any nausea, vomiting, diarrhea.  Denies any chest pain,

shortness of breath, abdominal pain, urinary symptoms other than hematuria.



PAST MEDICAL HISTORY:  Positive for esophageal cancer status post

chemoradiation, finished 3 months ago.  The patient also has history of breast

cancer, diabetes, atherosclerotic heart disease, anemia, atrial fibrillation,

hypertension, depression and hyperlipidemia.



SOCIAL HISTORY:  Negative for smoking, alcohol or drug use.



ALLERGIES:  She is listed as allergic to SINGULAIR.



REVIEW OF SYSTEMS:  As per HPI.  All other systems reviewed are negative.



CURRENT MEDICATIONS:  The patient is on methotrexate and Rocephin.



PHYSICAL EXAMINATION:

GENERAL:  Alert and oriented female, not in distress.

VITAL SIGNS:  Stable, afebrile.

HEENT:  Pupils are round and reacting.  No conjunctival lesion.  Extensive oral

ulceration present, very large with exudate and very tender.  It does not have

typical appearance of herpetic ulceration or aphthous ulcer or stomatitis from

chemotherapy, which anyway she is too far out from chemotherapy.

NECK:  Supple, no JVP, no lymphadenopathy.

LUNGS:  Clear.

HEART:  S1, S2 regular.

ABDOMEN:  Benign.

EXTREMITIES:  No edema, cyanosis.

SKIN:  Unremarkable.  The patient is neurologically intact.



LABORATORY DATA:  White count is 3.6, hemoglobin 8.9, platelets 160,000.  BUN

and creatinine is 17 and 0.8 with , .  Urinalysis, too numerous to

count WBC.



ASSESSMENT:

1.  Extensive ulcerative stomatitis, most likely secondary to methotrexate

toxicity.

2.  Elevated liver function test, also likely secondary to methotrexate.

3.  Hematuria, may have been related to methotrexate versus other etiology needs

to be ruled out.

4.  History of esophageal cancer status post chemoradiation, finishing around

October.

5.  Rheumatoid arthritis, on methotrexate.

6.  History of lung cancer.

7.  Dehydration.



RECOMMENDATIONS:  We will stop methotrexate, start leucovorin, fluids.  Change

Rocephin to Unasyn.  Supportive care and we will continue to follow.



Thank you very much, Dr. Bernardo, for giving me the opportunity to participate in

this patient's care.

 



______________________________

WAI TESFAYE MD



DR:  JUAN LUIS/tu  JOB#:  [id number] / [id number]

DD:  02/05/2019 11:09  DT:  02/05/2019 21:22

## 2019-02-06 NOTE — NUR
SW following pt. PT/OT recommends home independent. No skilled needs indicating at this 
time. SW will continue to evaluate needs.

## 2019-02-06 NOTE — NUR
Chart review done. Pt lives alone. Records indicate weakness. Pt may 

benefit from PT/OT Eval and Treat.

-------------------------------------------------------------------------------

Addendum: 02/06/19 at 0734 by JARRETT RIGGINS OT

-------------------------------------------------------------------------------

Amended: Links added.

## 2019-02-06 NOTE — PDOC
PROGRESS NOTES


Subjective


Subjective


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 

diagnosed on


08/09/2018. 





ROS - had bleeding from oral mucositis





Objective


Objective





Vital Signs








  Date Time  Temp Pulse Resp B/P (MAP) Pulse Ox O2 Delivery O2 Flow Rate FiO2


 


2/6/19 07:05 98.8 93 17 125/51 (75) 95 Room Air  





 98.8       














Intake and Output 


 


 2/6/19





 07:01


 


Intake Total 420 ml


 


Output Total 900 ml


 


Balance -480 ml


 


 


 


Intake Oral 420 ml


 


Output Urine Total 900 ml


 


# Voids 3











Physical Exam


Heart:  Normal S1, Normal S2


General:  Alert, Oriented X3


Lungs:  Clear to auscultation


Neuro:  Normal speech


Psych/Mental Status:  Mental status NL





Assessment


Assessment


IMPRESSION AND PLAN:





1.  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


08/09/2018.  Completed chemoradiation therapy on 10/24/2018.  I will plan for a


surveillance CT scan this month.  Clinically, I do not suspect recurrence but


needs CT scan for further evaluation.  I will order it after the oral mucositis


improves.


I d/w RN





2.  Oral mucositis could be from methotrexate.  Continue to monitor.


Ordered MAGIC MOUTHWASH.





3.  Rheumatoid arthritis.  She is on methotrexate, which will be held because of


oral mucositis.





4.  Atrial fibrillation.  Continue management per Cardiology.  Xarelto was kept


on hold by Dr. Bernardo because of bleeding from oral mucosa and hematuria.





5.  History of cerebrovascular accident.





6.  Anemia.  Hemoglobin is worse at 8.9 on 02/04/2019.  Continue to monitor.





Comment


Review of Relevant


I have reviewed the following items claudio (where applicable) has been applied.


Labs





Laboratory Tests








Test


 2/4/19


18:15 2/4/19


18:20 2/4/19


21:12 2/5/19


07:40


 


Urine Collection Type Unknown    


 


Urine Color Red    


 


Urine Clarity Bloody    


 


Urine pH 5.5    


 


Urine Specific Gravity 1.020    


 


Urine Protein


 100 mg/dL


(NEG-TRACE) 


 


 





 


Urine Glucose (UA)


 Negative mg/dL


(NEG) 


 


 





 


Urine Ketones (Stick) 15 mg/dL (NEG)    


 


Urine Blood Large (NEG)    


 


Urine Nitrite Negative (NEG)    


 


Urine Bilirubin Negative (NEG)    


 


Urine Urobilinogen Dipstick


 1.0 mg/dL (0.2


mg/dL) 


 


 





 


Urine Leukocyte Esterase Large (NEG)    


 


Urine RBC


 Tntc /HPF


(0-2) 


 


 





 


Urine WBC


 Tntc /HPF


(0-4) 


 


 





 


Urine Squamous Epithelial


Cells Occ /LPF 


 


 


 





 


Urine Bacteria


 Many /HPF


(0-FEW) 


 


 





 


Urine Hyaline Casts


 Occasional


/HPF 


 


 





 


Urine Mucus Mod /LPF    


 


White Blood Count


 


 3.6 x10^3/uL


(4.0-11.0) 


 





 


Red Blood Count


 


 2.62 x10^6/uL


(3.50-5.40) 


 





 


Hemoglobin


 


 8.9 g/dL


(12.0-15.5) 


 





 


Hematocrit


 


 26.5 %


(36.0-47.0) 


 





 


Mean Corpuscular Volume


 


 101 fL


() 


 





 


Mean Corpuscular Hemoglobin  34 pg (25-35)   


 


Mean Corpuscular Hemoglobin


Concent 


 34 g/dL


(31-37) 


 





 


Red Cell Distribution Width


 


 24.4 %


(11.5-14.5) 


 





 


Platelet Count


 


 160 x10^3/uL


(140-400) 


 





 


Neutrophils (%) (Auto)  89 % (31-73)   


 


Lymphocytes (%) (Auto)  9 % (24-48)   


 


Monocytes (%) (Auto)  1 % (0-9)   


 


Eosinophils (%) (Auto)  1 % (0-3)   


 


Basophils (%) (Auto)  1 % (0-3)   


 


Neutrophils # (Auto)


 


 3.3 x10^3uL


(1.8-7.7) 


 





 


Lymphocytes # (Auto)


 


 0.3 x10^3/uL


(1.0-4.8) 


 





 


Monocytes # (Auto)


 


 0.0 x10^3/uL


(0.0-1.1) 


 





 


Eosinophils # (Auto)


 


 0.0 x10^3/uL


(0.0-0.7) 


 





 


Basophils # (Auto)


 


 0.0 x10^3/uL


(0.0-0.2) 


 





 


Segmented Neutrophils %  95 % (35-66)   


 


Lymphocytes %  4 % (24-48)   


 


Basophils %  1 % (0-3)   


 


Platelet Estimate


 


 Adequate


(ADEQUATE) 


 





 


Polychromasia  Slight   


 


Anisocytosis  Mod   


 


Schistocytes  Occ   


 


Prothrombin Time


 


 28.9 SEC


(11.7-14.0) 


 





 


Prothromb Time International


Ratio 


 2.7 (0.8-1.1) 


 


 





 


Activated Partial


Thromboplast Time 


 42 SEC (24-38) 


 


 





 


Sodium Level


 


 139 mmol/L


(136-145) 


 





 


Potassium Level


 


 3.4 mmol/L


(3.5-5.1) 


 





 


Chloride Level


 


 104 mmol/L


() 


 





 


Carbon Dioxide Level


 


 28 mmol/L


(21-32) 


 





 


Anion Gap  7 (6-14)   


 


Blood Urea Nitrogen


 


 17 mg/dL


(7-20) 


 





 


Creatinine


 


 0.8 mg/dL


(0.6-1.0) 


 





 


Estimated GFR


(Cockcroft-Gault) 


 68.5 


 


 





 


BUN/Creatinine Ratio  21 (6-20)   


 


Glucose Level


 


 137 mg/dL


(70-99) 


 





 


Calcium Level


 


 8.8 mg/dL


(8.5-10.1) 


 





 


Total Bilirubin


 


 1.1 mg/dL


(0.2-1.0) 


 





 


Aspartate Amino Transf


(AST/SGOT) 


 187 U/L


(15-37) 


 





 


Alanine Aminotransferase


(ALT/SGPT) 


 207 U/L


(14-59) 


 





 


Alkaline Phosphatase


 


 160 U/L


() 


 





 


Total Protein


 


 6.4 g/dL


(6.4-8.2) 


 





 


Albumin


 


 2.6 g/dL


(3.4-5.0) 


 





 


Albumin/Globulin Ratio  0.7 (1.0-1.7)   


 


Glucose (Fingerstick)


 


 


 125 mg/dL


(70-99) 65 mg/dL


(70-99)


 


Test


 2/5/19


08:02 2/5/19


16:47 2/5/19


20:18 2/6/19


07:12


 


Glucose (Fingerstick)


 77 mg/dL


(70-99) 96 mg/dL


(70-99) 191 mg/dL


(70-99) 89 mg/dL


(70-99)








Laboratory Tests








Test


 2/5/19


16:47 2/5/19


20:18 2/6/19


07:12


 


Glucose (Fingerstick)


 96 mg/dL


(70-99) 191 mg/dL


(70-99) 89 mg/dL


(70-99)








Medications





Current Medications


Dextrose (Dextrose 50%-Water Syringe) 12.5 gm PRN Q15MIN  PRN IV SEE COMMENTS;  

Start 2/4/19 at 17:00


Sodium Chloride 1,000 ml @  75 mls/hr [id number] IV  Last administered on 2/5/19at 

20:20;  Start 2/4/19 at 17:30


Alprazolam (Xanax) 0.25 mg PRN QHS  PRN PO ANXIETY / AGITATION Last 

administered on 2/5/19at 23:25;  Start 2/4/19 at 17:30


Diphenhydramine HCl (Benadryl) 25 mg QHS PO  Last administered on 2/5/19at 20:19

;  Start 2/4/19 at 21:00


Docusate Sodium (Colace) 100 mg DAILY PO  Last administered on 2/5/19at 08:42;  

Start 2/5/19 at 09:00


Acetaminophen/ Hydrocodone Bitart (Lortab 5/325) 1 tab PRN Q6HRS  PRN PO 

MODERATE PAIN;  Start 2/4/19 at 17:30


Insulin Glargine (Lantus) 17 units DAILYWBKFT SQ  Last administered on 2/5/19at 

08:58;  Start 2/5/19 at 08:00


Isosorbide Mononitrate (Imdur) 30 mg DAILY PO  Last administered on 2/5/19at 08:

41;  Start 2/5/19 at 09:00


Bumetanide (Bumex) 4 mg DAILY PO  Last administered on 2/5/19at 08:42;  Start 2/ 5/19 at 09:00


Vitamin D (Vitamin D3) 1,000 unit DAILY PO  Last administered on 2/5/19at 08:41

;  Start 2/5/19 at 09:00


Insulin Human Lispro (HumaLOG) 4 units TIDWMEALS SQ  Last administered on 2/5/ 19at 12:55;  Start 2/4/19 at 18:00


Methotrexate (Rheumatrex) 15 mg Th PO ;  Start 2/14/19 at 09:00;  Stop 2/14/19 

at 09:00;  Status DC


Pantoprazole Sodium (Protonix) 40 mg DAILYAC PO  Last administered on 2/5/19at 

08:41;  Start 2/5/19 at 07:30


Simvastatin (Zocor) 40 mg QHS PO  Last administered on 2/5/19at 20:19;  Start 2/ 4/19 at 21:00


Ceftriaxone Sodium (Rocephin) 1 gm Q24H IVP  Last administered on 2/4/19at 21:40

;  Start 2/4/19 at 21:00;  Stop 2/5/19 at 11:06;  Status DC


Leucovorin Calcium (Wellcovorin) 15 mg Q6HRS PO  Last administered on 2/6/19at 

05:27;  Start 2/5/19 at 12:00


Ampicillin Sodium/ Sulbactam Sodium 3 gm/Sodium Chloride 100 ml @  200 mls/hr 

Q6HRS IV  Last administered on 2/6/19at 05:28;  Start 2/5/19 at 12:00





Active Scripts


Active


Reported


Hydrocodone-Apap 5-325  ** (Hydrocodone Bit/Acetaminophen) 1 Tab Tablet 1 Tab 

PO PRN Q6HRS PRN


Xanax (Alprazolam) 0.25 Mg Tablet 0.25 Mg PO HS PRN


Basaglar Kwikpen U-100 (Insulin Glargine,Hum.rec.anlog) 100 Unit/1 Ml 

Insuln.pen 17 Unit SQ DAILYWBKFT


Methotrexate (Methotrexate Sodium) 2.5 Mg Tablet 6 Tab PO WEEKLY ON THURSDAY


Benadryl (Diphenhydramine Hcl) 25 Mg Capsule 1 Cap PO QHS


Bumetanide 2 Mg Tablet 2 Tab PO DAILY


Isosorbide Mononitrate Er (Isosorbide Mononitrate) 30 Mg Tab.er.24h 30 Mg PO 

DAILY


Prilosec Otc (Omeprazole Magnesium) 20 Mg Tablet.dr 20 Mg PO DAILY


Novolog Flexpen (Insulin Aspart) 100 Unit/1 Ml Insuln.pen 4 Unit SQ TIDBFRMEAL


Vitamin D-3 (Cholecalciferol (Vitamin D3)) 2,000 Unit Capsule 1,000 Unit PO 

DAILY


Simvastatin 40 Mg Tablet 40 Mg PO DAILY


Colace (Docusate Sodium) 100 Mg Capsule 100 Mg PO DAILY


Vitals/I & O





Vital Sign - Last 24 Hours








 2/5/19 2/5/19 2/5/19 2/5/19





 08:41 11:16 15:44 19:14


 


Temp  98.3 99.9 98.1





  98.3 99.9 98.1


 


Pulse 95 101 100 99


 


Resp  20 20 20


 


B/P (MAP) 129/40 142/57 (85) 123/46 (71) 121/34 (63)


 


Pulse Ox  97 100 97


 


O2 Delivery  Room Air Room Air Room Air


 


    





    





 2/5/19 2/5/19 2/5/19 2/6/19





 20:00 23:00 23:17 03:19


 


Temp  98.1 99.3 98.9





  98.1 99.3 98.9


 


Pulse  99 84 81


 


Resp   20 16


 


B/P (MAP)  121/34 (63) 132/42 (72) 126/51 (76)


 


Pulse Ox  97 97 96


 


O2 Delivery Room Air Room Air Room Air Room Air


 


    





    





 2/6/19   





 07:05   


 


Temp 98.8   





 98.8   


 


Pulse 93   


 


Resp 17   


 


B/P (MAP) 125/51 (75)   


 


Pulse Ox 95   


 


O2 Delivery Room Air   














Intake and Output   


 


 2/5/19 2/5/19 2/6/19





 15:01 23:01 07:01


 


Intake Total 220 ml 200 ml 


 


Output Total   900 ml


 


Balance 220 ml 200 ml -900 ml











Nutrition Consultation


Dietary Evaluation:


Recommendations by RD:  Increase Calorie Intake, Protein supplementation


Comments:  


send ensure puddings and ensure enlive shakes with meals


Expected Outcomes/Goals:  


to meet > 75% est nutr needs





Malnutrition Findings:


Food and Nutrition Intake (Sev:  <50% est energy req 5days


Body Fat Depletion (Non Severe:  Mild Depletion


Weight Status:  Obese











HERBERTH STEWART MD Feb 6, 2019 08:26

## 2019-02-06 NOTE — PN
DATE:  02/05/2019



LOCATION:  She is in room 663.



SUBJECTIVE:  The patient is awake, alert, still complaining about mouth pain as

well as generalized fatigue.



LABORATORY DATA:  Initial laboratory results show a relative leukopenia with a

white count of 3600, but 95% segs.  Hemoglobin is 8.9.  Sugars have been good

since admission.  Potassium was 3.4.  She does have some elevated liver function

tests with an AST and ALT of 187 and 207 respectively and alkaline phosphatase

of 160.  Albumin is 2.6.  INR was 2.7 on admission, although she is taking one

of the NOACs.  PTT was 42.  This is currently on hold.  Urine showed too

numerous to count red cells and white cells and leukocyte esterase positive and

Rocephin has been started for the same.  She requests if possible to see Dr. Champion as well as she is here.  She has repeat CAT scans coming up for her cancer

of the esophagus and I will ask him to come by in addition.



OBJECTIVE:

VITAL SIGNS:  Stable.  She is afebrile.

CHEST:  Clear.

HEART:  Regular.

ABDOMEN:  Benign.

HEENT:  Mouth is still full of sores.



ASSESSMENT:

1.  Mouthful of sores.

2.  Hematuria, likely related to urinary tract infection with all the white

cells in addition.

3.  As yet undefined vaginal lesion with GYN to see today.

4.  Diabetes.

5.  Cancer of the esophagus.



PLAN:  Continue present antibiotics.  Await ID, GYN consults.  We will ask

Oncology to come by as noted above.  Otherwise, continue present care.

 



______________________________

MARSHALL RICHARDS MD



DR:  VENKATA/tu  JOB#:  [id number] / [id number]

DD:  02/05/2019 08:36  DT:  02/05/2019 19:35

## 2019-02-06 NOTE — PDOC
Infectious Disease Note


Subjective


Subjective


feeling slightly better





ROS


ROS


no n/v/d/fever





Vital Sign


Vital Signs





Vital Signs








  Date Time  Temp Pulse Resp B/P (MAP) Pulse Ox O2 Delivery O2 Flow Rate FiO2


 


2/6/19 10:48 98.3 84 18 134/47 (76) 97 Room Air  





 98.3       











Physical Exam


PHYSICAL EXAM


VITAL SIGNS:  Stable, afebrile.


HEENT:  Pupils are round and reacting.  No conjunctival lesion.  Extensive oral


ulceration present, very large with exudate and very tender.  It does not have


typical appearance of herpetic ulceration or aphthous ulcer or stomatitis from


chemotherapy, which anyway she is too far out from chemotherapy.


NECK:  Supple, no JVP, no lymphadenopathy.


LUNGS:  Clear.


HEART:  S1, S2 regular.


ABDOMEN:  Benign.


EXTREMITIES:  No edema, cyanosis.


SKIN:  Unremarkable.  The patient is neurologically intact.





Labs


Lab





Laboratory Tests








Test


 2/5/19


16:47 2/5/19


20:18 2/6/19


07:12


 


Glucose (Fingerstick)


 96 mg/dL


(70-99) 191 mg/dL


(70-99) 89 mg/dL


(70-99)











Objective


Assessment


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


Hematuria, ? from Methotrexate vs other


H/O Esophageal ca s/p chemo radiation


RA


H/O Lung ca


Dehydration


Elevated LFT sec to Methotrexate





Plan


Plan of Care


stop Methotrexate


Leukovorin


fluids


WAI Feldman MD Feb 6, 2019 11:04

## 2019-02-06 NOTE — CONS
DATE OF CONSULTATION:  02/05/2019



TYPE OF REPORT:  Medical oncology consultation.



CONSULTATION REQUESTING PHYSICIAN:  Shawn Bernardo M.D. 



REASON FOR CONSULTATION:  Esophageal cancer, status post chemoradiation therapy,

now admitted with oral mucositis.



HISTORY OF PRESENT ILLNESS:  The patient is an 83-year-old  female who

was noted to have anemia with a hemoglobin of 8.0 in August of 2018.  Upper

endoscopy on 08/09/2018 revealed multiple nodules in the esophagus and the

biopsy was consistent with poorly differentiated esophageal adenocarcinoma.  It

was staged as a T1b N0 M0, stage 1 esophageal cancer.  She received concurrent

chemoradiation therapy with carboplatin and Taxol from 09/19/2018.  She received

the fourth dose on 10/24/2018 and she developed significant nausea and vomiting

and hence subsequent chemotherapy was discontinued.  She is now on surveillance

only.



She was admitted to [facility] on 02/04/2019 with complaints of

significant mouth sores and bleeding from oral mucositis and hematuria as well

as epistaxis.  She has been on Xarelto for atrial fibrillation, which was held. 

I was asked to see the patient for continued surveillance of esophageal cancer.



PAST MEDICAL HISTORY:  Esophageal cancer, TIA, coronary artery disease, CABG,

hyperlipidemia, hypertension, diabetes, breast cancer status post left

lumpectomy and right mastectomy.



FAMILY HISTORY:  Negative for esophageal cancer.  No history of malignancy.



SOCIAL HISTORY:  No smoking or alcohol abuse.



REVIEW OF SYSTEMS:  A 12-point review of system was performed.  Pertinent

positives are mentioned in the history of present illness.  Rest of the system

review is negative.



PHYSICAL EXAMINATION:

GENERAL APPEARANCE:  The patient is an 83-year-old  female who is in no

acute cardiorespiratory distress.

VITAL SIGNS:  Blood pressure 123/46 and temperature 99.9.

HEENT:  Atraumatic and normocephalic.  Eyes:  No icterus.

NECK:  Supple.

CHEST:  Bilaterally symmetrical.

HEART:  S1 and S2 normal.

ABDOMEN:  Soft and nontender.

CENTRAL NERVOUS SYSTEM:  No focal deficits.

LYMPHATICS:  No lymphadenopathy.

SKIN:  No rashes.

PSYCHOLOGIC:  Mood and affect are appropriate.

MUSCULOSKELETAL:  Oral exam reveals evidence of oral mucositis.



LABORATORY DATA:  WBC 3.6, hemoglobin 8.9 and platelet count 160.  Creatinine

0.8.



IMPRESSION AND PLAN:

1.  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

08/09/2018.  Completed chemoradiation therapy on 10/24/2018.  I will plan for a

surveillance CT scan this month.  Clinically, I do not suspect recurrence but

needs CT scan for further evaluation.  I will order it after the oral mucositis

subsides.

2.  Oral mucositis could be from methotrexate.  Continue to monitor.

3.  Rheumatoid arthritis.  She is on methotrexate, which will be held because of

oral mucositis.

4.  Atrial fibrillation.  Continue management per Cardiology.  Xarelto was kept

on hold by Dr. Bernardo because of bleeding from oral mucosa and hematuria.

5.  History of cerebrovascular accident.

6.  Anemia.  Hemoglobin is worse at 8.9 on 02/04/2019.  Continue to monitor.

 



______________________________

HERBERTH STEWART MD



DR:  JOSEPH/tu  JOB#:  [id number] / [id number]

DD:  02/05/2019 16:51  DT:  02/06/2019 03:14

## 2019-02-07 NOTE — PDOC
Infectious Disease Note


Subjective


Subjective


feeling slightly better





ROS


ROS


no n/v/d/





Vital Sign


Vital Signs





Vital Signs








  Date Time  Temp Pulse Resp B/P (MAP) Pulse Ox O2 Delivery O2 Flow Rate FiO2


 


2/7/19 09:10      Room Air  


 


2/7/19 08:09  93  122/62    


 


2/7/19 07:11 98.9  18  92   





 98.9       











Physical Exam


PHYSICAL EXAM


VITAL SIGNS:  Stable, afebrile.


HEENT:  Pupils are round and reacting.  No conjunctival lesion.  Extensive oral


ulceration present, very large with exudate and very tender.  It does not have


typical appearance of herpetic ulceration or aphthous ulcer or stomatitis from


chemotherapy, which anyway she is too far out from chemotherapy.


NECK:  Supple, no JVP, no lymphadenopathy.


LUNGS:  Clear.


HEART:  S1, S2 regular.


ABDOMEN:  Benign.


EXTREMITIES:  No edema, cyanosis.


SKIN:  Unremarkable.  The patient is neurologically intact.





Labs


Lab





Laboratory Tests








Test


 2/6/19


11:42 2/6/19


17:23 2/6/19


20:25 2/7/19


05:30


 


Glucose (Fingerstick)


 201 mg/dL


(70-99) 152 mg/dL


(70-99) 113 mg/dL


(70-99) 





 


White Blood Count


 


 


 


 2.3 x10^3/uL


(4.0-11.0)


 


Red Blood Count


 


 


 


 2.32 x10^6/uL


(3.50-5.40)


 


Hemoglobin


 


 


 


 8.0 g/dL


(12.0-15.5)


 


Hematocrit


 


 


 


 23.7 %


(36.0-47.0)


 


Mean Corpuscular Volume


 


 


 


 102 fL


()


 


Mean Corpuscular Hemoglobin    35 pg (25-35) 


 


Mean Corpuscular Hemoglobin


Concent 


 


 


 34 g/dL


(31-37)


 


Red Cell Distribution Width


 


 


 


 24.8 %


(11.5-14.5)


 


Platelet Count


 


 


 


 175 x10^3/uL


(140-400)


 


Neutrophils (%) (Auto)    69 % (31-73) 


 


Lymphocytes (%) (Auto)    15 % (24-48) 


 


Monocytes (%) (Auto)    7 % (0-9) 


 


Eosinophils (%) (Auto)    8 % (0-3) 


 


Basophils (%) (Auto)    1 % (0-3) 


 


Neutrophils # (Auto)


 


 


 


 1.6 x10^3uL


(1.8-7.7)


 


Lymphocytes # (Auto)


 


 


 


 0.3 x10^3/uL


(1.0-4.8)


 


Monocytes # (Auto)


 


 


 


 0.2 x10^3/uL


(0.0-1.1)


 


Eosinophils # (Auto)


 


 


 


 0.2 x10^3/uL


(0.0-0.7)


 


Basophils # (Auto)


 


 


 


 0.0 x10^3/uL


(0.0-0.2)


 


Test


 2/7/19


06:51 


 


 





 


Glucose (Fingerstick)


 87 mg/dL


(70-99) 


 


 











Micro





Microbiology


2/4/19 Urine Culture - Preliminary, Resulted


         


2/4/19 Urine Culture Result 1 (HARRY) - Preliminary, Resulted





Objective


Assessment


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


Hematuria, ? from Methotrexate vs other


H/O Esophageal ca s/p chemo radiation


RA


H/O Lung ca


Dehydration


Elevated LFT sec to Methotrexate





Plan


Plan of Care





Leukovorin


fluids


WAI Feldman MD Feb 7, 2019 10:42

## 2019-02-07 NOTE — PDOC2
MARILIN CROWE APRN 2/7/19 1356:


UROLOGY CONSULT


Date of Consult


Date of Consult


DATE: 2/7/19 


TIME: 13:43





Reason for Consult


Reason for Consult:


Gross Hematuria





Identification/Chief Complaint


Chief Complaint


Gross Hematuria





Source


Source:  Caregiver, Chart review, Patient





History of Present Illness


Reason for Visit:


Patient is an 83 year old female complaining of a five day history of gross 

hematuria. It started on Sunday morning. She wiped after going to the bathroom 

and saw it in the toilet.  Presently, she is still having hematuria, but no 

clots. It resembles a koolade or Gatorade and it does not hurt when she goes to 

the bathroom. She feels like she empties her bladder well. She denies a history 

of bladder cancer or kidney cancer or problems,and this is the first time she 

has ever seen blood in her urine. She also denies a history of kidney stones.





Past Medical History


Cardiovascular:  CAD, HTN, Hyperlipidemia


CENTRAL NERVOUS SYSTEM:  CVA, TIA


GI:  GERD


Heme/Onc:  Cancer


Musculoskeletal:  Osteoarthritis


Rheumatologic:  Rheumatoid arthritis


Endocrine:  Diabetes


Grav:  3


Para:  0





Past Surgical History


Past Surgical History:  CABG, Total knee replacement





Family History


Family History:  Family History Unknown





Social History


Quit (Quit in 1983 and smoked less than 1/2 pack a day when she did smoke. )


ALCOHOL:  none


Drugs:  None


Lives:  with Family





Current Problem List


Problems:  


(1) Gross hematuria





Current Medications


Current Medications





Current Medications


Info (CONTRAST GIVEN -- Rx MONITORING) 1 each PRN DAILY  PRN MC SEE COMMENTS;  

Start 2/7/19 at 09:45;  Stop 2/9/19 at 09:44


Iohexol (Omnipaque 300 Mg/ml) 75 ml 1X  ONCE IV  Last administered on 2/7/19at 

10:00;  Start 2/7/19 at 10:00;  Stop 2/7/19 at 10:01;  Status DC


Methotrexate (Rheumatrex) 15 mg Th PO ;  Start 2/14/19 at 09:00;  Stop 2/14/19 

at 09:00;  Status DC


Ondansetron HCl (Zofran) 4 mg PRN Q4HRS  PRN IV NAUSEA/VOMITING Last 

administered on 2/7/19at 08:38;  Start 2/7/19 at 08:45





Allergies


Allergies:  


Coded Allergies:  


     milk (Verified  Allergy, Intermediate, 8/9/18)


     montelukast (Verified  Allergy, Intermediate, 8/9/18)


     I S O L A T I O N *CONTACT* (Verified  Allergy, Unknown, 11/5/18)


 


 mrsa





ROS


Review Of Systems:


CONSTITUTIONAL:        No fever or chills


EYES:                          No recent changes


SKIN:               + mouth sores, + open areas in folds.


CARDIOVASCULAR:     No chest pain, syncope, palpitations, or edema


RESPIRATORY:            No SOB or cough


GASTROINTESTINAL:    No nausea, vomiting or abdominal pain


NEUROLOGICAL:          No headaches or weakness


ENDOCRINE:               No cold or heat intolerance


GENITOURINARY:        + Gross Hematuria, no dysuria or difficulty emptying 

bladder.  


MUSCULOSKELETAL:   No back pain or joint pain


LYMPHATICS:               No enlarged lymph nodes


PSYCHIATRIC:              No anxiety or depression





Physical Exam


Physical Exam:


General: Pleasant, no acute distress, well groomed


Eyes: conjunctiva anicteric, eyes full range of motion


ENT: moist oral mucosa, normal dentition


Neck: Trachea midline, no masses


Respiratory: unlabored breathing, not using accessory muscles


Abdomen: soft nontender, nondistended, obese


: small dab of blood noted at urethra entrance. 


Skin: + skin breakdown noted in folds below abdomen. 


Psych: normal mood, affect. Alert and oriented x 3.





Vitals


VITALS





Vital Signs








  Date Time  Temp Pulse Resp B/P (MAP) Pulse Ox O2 Delivery O2 Flow Rate FiO2


 


2/7/19 11:07      Room Air  


 


2/7/19 11:00 98.1 107 16 139/55 (83) 98   





 98.1       











Labs


Labs





Laboratory Tests








Test


 2/5/19


16:47 2/5/19


20:18 2/6/19


07:12 2/6/19


11:42


 


Glucose (Fingerstick)


 96 mg/dL


(70-99) 191 mg/dL


(70-99) 89 mg/dL


(70-99) 201 mg/dL


(70-99)


 


Test


 2/6/19


17:23 2/6/19


20:25 2/7/19


05:30 2/7/19


06:51


 


Glucose (Fingerstick)


 152 mg/dL


(70-99) 113 mg/dL


(70-99) 


 87 mg/dL


(70-99)


 


White Blood Count


 


 


 2.3 x10^3/uL


(4.0-11.0) 





 


Red Blood Count


 


 


 2.32 x10^6/uL


(3.50-5.40) 





 


Hemoglobin


 


 


 8.0 g/dL


(12.0-15.5) 





 


Hematocrit


 


 


 23.7 %


(36.0-47.0) 





 


Mean Corpuscular Volume


 


 


 102 fL


() 





 


Mean Corpuscular Hemoglobin   35 pg (25-35)  


 


Mean Corpuscular Hemoglobin


Concent 


 


 34 g/dL


(31-37) 





 


Red Cell Distribution Width


 


 


 24.8 %


(11.5-14.5) 





 


Platelet Count


 


 


 175 x10^3/uL


(140-400) 





 


Neutrophils (%) (Auto)   69 % (31-73)  


 


Lymphocytes (%) (Auto)   15 % (24-48)  


 


Monocytes (%) (Auto)   7 % (0-9)  


 


Eosinophils (%) (Auto)   8 % (0-3)  


 


Basophils (%) (Auto)   1 % (0-3)  


 


Neutrophils # (Auto)


 


 


 1.6 x10^3uL


(1.8-7.7) 





 


Lymphocytes # (Auto)


 


 


 0.3 x10^3/uL


(1.0-4.8) 





 


Monocytes # (Auto)


 


 


 0.2 x10^3/uL


(0.0-1.1) 





 


Eosinophils # (Auto)


 


 


 0.2 x10^3/uL


(0.0-0.7) 





 


Basophils # (Auto)


 


 


 0.0 x10^3/uL


(0.0-0.2) 





 


Test


 2/7/19


11:49 


 


 





 


Glucose (Fingerstick)


 110 mg/dL


(70-99) 


 


 











Laboratory Tests








Test


 2/6/19


17:23 2/6/19


20:25 2/7/19


05:30 2/7/19


06:51


 


Glucose (Fingerstick)


 152 mg/dL


(70-99) 113 mg/dL


(70-99) 


 87 mg/dL


(70-99)


 


White Blood Count


 


 


 2.3 x10^3/uL


(4.0-11.0) 





 


Red Blood Count


 


 


 2.32 x10^6/uL


(3.50-5.40) 





 


Hemoglobin


 


 


 8.0 g/dL


(12.0-15.5) 





 


Hematocrit


 


 


 23.7 %


(36.0-47.0) 





 


Mean Corpuscular Volume


 


 


 102 fL


() 





 


Mean Corpuscular Hemoglobin   35 pg (25-35)  


 


Mean Corpuscular Hemoglobin


Concent 


 


 34 g/dL


(31-37) 





 


Red Cell Distribution Width


 


 


 24.8 %


(11.5-14.5) 





 


Platelet Count


 


 


 175 x10^3/uL


(140-400) 





 


Neutrophils (%) (Auto)   69 % (31-73)  


 


Lymphocytes (%) (Auto)   15 % (24-48)  


 


Monocytes (%) (Auto)   7 % (0-9)  


 


Eosinophils (%) (Auto)   8 % (0-3)  


 


Basophils (%) (Auto)   1 % (0-3)  


 


Neutrophils # (Auto)


 


 


 1.6 x10^3uL


(1.8-7.7) 





 


Lymphocytes # (Auto)


 


 


 0.3 x10^3/uL


(1.0-4.8) 





 


Monocytes # (Auto)


 


 


 0.2 x10^3/uL


(0.0-1.1) 





 


Eosinophils # (Auto)


 


 


 0.2 x10^3/uL


(0.0-0.7) 





 


Basophils # (Auto)


 


 


 0.0 x10^3/uL


(0.0-0.2) 





 


Test


 2/7/19


11:49 


 


 





 


Glucose (Fingerstick)


 110 mg/dL


(70-99) 


 


 














Images


Images


IMPRESSION:


1. Multiple pulmonary nodules, have increased in size and number since 


prior study, compatible with metastatic disease.


2. Areas of subtle hepatic hypodensity, at least one of which may have 


progressed, but difficult to characterize on this exam.


3. Thickening of the distal esophagus appears similar.


4. Mild ascending aorta ectasia is stable.





Assessment/Plan


Assessment/Plan


Gross Hematuria:


CT ABD PELVIS reviewed by FNBRIANNE Crowe and Dr. Kendrick: No obvious causes for 

hematuria found on film  Continue treatment for UTI, urine culture did show E. 

Coli.    


Cystoscopy as an outpatient. Will coordinate with  to arrange 

appointment.





ROBBIE KENDRICK MD 2/9/19 1450:


UROLOGY CONSULT


Assessment/Plan


Assessment/Plan


Patient seen and examined.


Agree with assessment and plan.


Hematuria likely due to UTI.


Outpatient followup for cystoscopy.











MARILIN CROWE Feb 7, 2019 13:56


ROBBIE KENDRICK MD Feb 9, 2019 14:50

## 2019-02-07 NOTE — RAD
CT CHEST ABD PELVIS W/CONTRAST

 

Indication: Esophageal cancer, post chemoradiation.

 

Exposure: One or more of the following individualized dose reduction 

techniques were utilized for this examination:  1. Automated exposure 

control  2. Adjustment of the mA and/or kV according to patient size  3. 

Use of iterative reconstruction technique.

 

Comparison: November 5, 2018

Contrast: Intravenous and oral contrast.

 

CHEST:

Atherosclerotic calcifications of the aorta without gross aneurysm. 

Ascending aorta is mildly ectatic, measuring 3.8 cm, stable. Pulmonary 

artery dilatation again seen suggesting pulmonary artery hypertension. 

Coronary artery calcification. Cardiomegaly.

 

Scattered small mediastinal lymph nodes are seen. No significant change, 

no significant lymph node enlargement. No significant pleural effusion. 

Thickening of the distal esophagus is again identified.

 

Multiple pulmonary nodules identified. These have increased in size and 

number since prior study. For example, right lower lobe nodule abutting 

the major fissure measures 12 mm, not seen previously. A left upper lobe 

nodule measures 8 mm, increased from 4 mm previously. Trachea and central 

airways are patent. Degenerative spondylosis. Postsurgical changes of the 

chest and sternum. Left chest wall port identified tip identified in 

superior vena cava. No aggressive bone destruction.

 

ABDOMEN PELVIS:

Areas of subtle hypoattenuation in the liver, difficult to identify and 

characterize on this exam, correlate with previous PET scan report. At 

least one of these in the lower right lobe appears more prominent than on 

prior study.

 

Spleen calcified otherwise unremarkable. Pancreas unremarkable. No adrenal

mass. Kidneys demonstrate no evidence of a mass. No hydronephrosis. 

Gallbladder surgically absent. Aorta calcified without aneurysm. Dense 

calcification at major aortic branch origins. No evidence of pathologic 

lymph node enlargement.

 

No bowel obstruction. No evidence of acute colitis.

 

No evidence of pneumoperitoneum. No ascites identified. No evidence of 

pelvic mass. Urinary bladder is unremarkable. Severe degenerative 

spondylosis and spondylolisthesis, overall appears similar on sagittal 

image. No aggressive bone destruction.

 

IMPRESSION:

1. Multiple pulmonary nodules, have increased in size and number since 

prior study, compatible with metastatic disease.

2. Areas of subtle hepatic hypodensity, at least one of which may have 

progressed, but difficult to characterize on this exam.

3. Thickening of the distal esophagus appears similar.

4. Mild ascending aorta ectasia is stable.

 

Electronically signed by: Bruce Barillas MD (2/7/2019 12:58 PM) [id number]

## 2019-02-07 NOTE — PN
DATE:  02/07/2019



LOCATION:  Room 663.



SUBJECTIVE:  The patient is awake, alert, definitely feels somewhat better

today, feels like the mouth has improved slightly.  Would prefer never to be on

methotrexate again if that is what is causing it and I agree.



OBJECTIVE:

VITAL SIGNS:  Stable.  She has been afebrile over the last 24 hours.

CHEST:  Clear.

HEART:  Regular.

ABDOMEN:  Benign.

HEENT:  Stomatitis has slightly improved.



LABORATORY DATA:  White count is only 2300 this morning despite leucovorin,

hemoglobin has dipped to 8, platelet count remains good at 175.  Sugars are

good.  Urine culture is growing out E. coli greater than 100,000 without

sensitivities to date.



IMPRESSION:

1.  Hematuria likely due to infection with Escherichia coli in the urine, on

antibiotics.

2.  Stomatitis with slight improvement.

3.  Diabetes.

4.  Cancer of the esophagus.

5.  Leukopenia and anemia.



PLAN:  Continue present care.  Help from Oncology and Infectious Disease are

appreciated.  Unasyn is ongoing at this point in time for urine infection.  We

will await final cultures and adjust from there.  From the patient's

description, she is doing fairly well in therapy and would much prefer to return

to home when she is ready rather than any sort of stay at a skilled nursing or

otherwise at the time of discharge:.

 



______________________________

MARSHALL RICHARDS MD



DR:  VENKATA/tu  JOB#:  [id number] / [id number]

DD:  02/07/2019 11:57  DT:  02/07/2019 19:56

## 2019-02-07 NOTE — PN
DATE:  02/06/2019



LOCATION:  Room 663.



SUBJECTIVE:  The patient is awake and alert, states she feels like she felt a

little bit better overall yesterday, but this morning is not sure that she is

any better.



OBJECTIVE:

VITAL SIGNS:  Stable.  T-max 99.5.

GENERAL:  The patient is awake and alert.

CHEST:  With good breath sounds.

HEART:  Regular.

ABDOMEN:  Benign.  Stomatitis continues to be severe.

NEUROLOGIC:  She is intact.



LABORATORY DATA:  The patient's hematuria seemed to be gone yesterday, but is

back today.  There is no urine culture report present yet, but with urine full

of white blood cells in addition, infection is a consideration, although I

suppose it could be from the vaginal lesion in addition.  Gynecological

examination has been done and they recommend urologist to see the patient and

this will be ordered.



IMPRESSION:

1.  Stomatitis.

2.  Hematuria, urinary tract infection versus methotrexate.  GYN evaluation is

noted in her note with Urology consult to be ordered.

3.  Diabetes.

4.  Cancer of the esophagus.



PLAN:  Continue present care, help of all consultants appreciated.  We will ask

for Urology input and check a CBC again tomorrow and add leucovorin.

 



______________________________

MARSHALL RICHARDS MD



DR:  VENKATA/tu  JOB#:  [id number] / [id number]

DD:  02/06/2019 17:14  DT:  02/07/2019 01:39

## 2019-02-07 NOTE — PDOC
PROGRESS NOTES


Subjective


Subjective


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 

diagnosed on


08/09/2018.





ROS - has mucositis





Objective


Objective





Vital Signs








  Date Time  Temp Pulse Resp B/P (MAP) Pulse Ox O2 Delivery O2 Flow Rate FiO2


 


2/7/19 11:07      Room Air  


 


2/7/19 11:00 98.1 107 16 139/55 (83) 98   





 98.1       














Intake and Output 


 


 2/7/19





 07:01


 


Intake Total 1380 ml


 


Output Total 300 ml


 


Balance 1080 ml


 


 


 


Intake Oral 1380 ml


 


Output Urine Total 300 ml


 


# Voids 6











Physical Exam


Heart:  Normal S1, Normal S2


General:  Alert, Oriented X3


Lungs:  Clear to auscultation


Neuro:  Normal speech


Psych/Mental Status:  Mental status NL





Assessment


Assessment


IMPRESSION AND PLAN:





1.  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


08/09/2018.  Completed chemoradiation therapy on 10/24/2018.  I will plan for a


surveillance CT scan this month.  Clinically, I do not suspect recurrence but


needs CT scan for further evaluation.  I will order it after the oral mucositis


improves.


I d/w RN





2.  Oral mucositis could be from methotrexate.  Continue to monitor.


Ordered MAGIC MOUTHWASH 2/6/19.





3.  Rheumatoid arthritis.  She is on methotrexate, which will be held because of


oral mucositis.





4.  Atrial fibrillation.  Continue management per Cardiology.  Xarelto was kept


on hold by Dr. Bernardo because of bleeding from oral mucosa and hematuria.





5.  History of cerebrovascular accident.





6.  Anemia.  Hemoglobin is worse at 8.9 on 02/04/2019 and 8.0 on 2/7/19.  

Continue to monitor.





Comment


Review of Relevant


I have reviewed the following items claudio (where applicable) has been applied.


Labs





Laboratory Tests








Test


 2/5/19


16:47 2/5/19


20:18 2/6/19


07:12 2/6/19


11:42


 


Glucose (Fingerstick)


 96 mg/dL


(70-99) 191 mg/dL


(70-99) 89 mg/dL


(70-99) 201 mg/dL


(70-99)


 


Test


 2/6/19


17:23 2/6/19


20:25 2/7/19


05:30 2/7/19


06:51


 


Glucose (Fingerstick)


 152 mg/dL


(70-99) 113 mg/dL


(70-99) 


 87 mg/dL


(70-99)


 


White Blood Count


 


 


 2.3 x10^3/uL


(4.0-11.0) 





 


Red Blood Count


 


 


 2.32 x10^6/uL


(3.50-5.40) 





 


Hemoglobin


 


 


 8.0 g/dL


(12.0-15.5) 





 


Hematocrit


 


 


 23.7 %


(36.0-47.0) 





 


Mean Corpuscular Volume


 


 


 102 fL


() 





 


Mean Corpuscular Hemoglobin   35 pg (25-35)  


 


Mean Corpuscular Hemoglobin


Concent 


 


 34 g/dL


(31-37) 





 


Red Cell Distribution Width


 


 


 24.8 %


(11.5-14.5) 





 


Platelet Count


 


 


 175 x10^3/uL


(140-400) 





 


Neutrophils (%) (Auto)   69 % (31-73)  


 


Lymphocytes (%) (Auto)   15 % (24-48)  


 


Monocytes (%) (Auto)   7 % (0-9)  


 


Eosinophils (%) (Auto)   8 % (0-3)  


 


Basophils (%) (Auto)   1 % (0-3)  


 


Neutrophils # (Auto)


 


 


 1.6 x10^3uL


(1.8-7.7) 





 


Lymphocytes # (Auto)


 


 


 0.3 x10^3/uL


(1.0-4.8) 





 


Monocytes # (Auto)


 


 


 0.2 x10^3/uL


(0.0-1.1) 





 


Eosinophils # (Auto)


 


 


 0.2 x10^3/uL


(0.0-0.7) 





 


Basophils # (Auto)


 


 


 0.0 x10^3/uL


(0.0-0.2) 





 


Test


 2/7/19


11:49 


 


 





 


Glucose (Fingerstick)


 110 mg/dL


(70-99) 


 


 











Laboratory Tests








Test


 2/6/19


17:23 2/6/19


20:25 2/7/19


05:30 2/7/19


06:51


 


Glucose (Fingerstick)


 152 mg/dL


(70-99) 113 mg/dL


(70-99) 


 87 mg/dL


(70-99)


 


White Blood Count


 


 


 2.3 x10^3/uL


(4.0-11.0) 





 


Red Blood Count


 


 


 2.32 x10^6/uL


(3.50-5.40) 





 


Hemoglobin


 


 


 8.0 g/dL


(12.0-15.5) 





 


Hematocrit


 


 


 23.7 %


(36.0-47.0) 





 


Mean Corpuscular Volume


 


 


 102 fL


() 





 


Mean Corpuscular Hemoglobin   35 pg (25-35)  


 


Mean Corpuscular Hemoglobin


Concent 


 


 34 g/dL


(31-37) 





 


Red Cell Distribution Width


 


 


 24.8 %


(11.5-14.5) 





 


Platelet Count


 


 


 175 x10^3/uL


(140-400) 





 


Neutrophils (%) (Auto)   69 % (31-73)  


 


Lymphocytes (%) (Auto)   15 % (24-48)  


 


Monocytes (%) (Auto)   7 % (0-9)  


 


Eosinophils (%) (Auto)   8 % (0-3)  


 


Basophils (%) (Auto)   1 % (0-3)  


 


Neutrophils # (Auto)


 


 


 1.6 x10^3uL


(1.8-7.7) 





 


Lymphocytes # (Auto)


 


 


 0.3 x10^3/uL


(1.0-4.8) 





 


Monocytes # (Auto)


 


 


 0.2 x10^3/uL


(0.0-1.1) 





 


Eosinophils # (Auto)


 


 


 0.2 x10^3/uL


(0.0-0.7) 





 


Basophils # (Auto)


 


 


 0.0 x10^3/uL


(0.0-0.2) 





 


Test


 2/7/19


11:49 


 


 





 


Glucose (Fingerstick)


 110 mg/dL


(70-99) 


 


 











Microbiology


2/4/19 Urine Culture - Preliminary, Resulted


         


2/4/19 Urine Culture Result 1 (HARRY) - Preliminary, Resulted


Medications





Current Medications


Dextrose (Dextrose 50%-Water Syringe) 12.5 gm PRN Q15MIN  PRN IV SEE COMMENTS;  

Start 2/4/19 at 17:00


Sodium Chloride 1,000 ml @  75 mls/hr [id number] IV  Last administered on 2/7/19at 

02:38;  Start 2/4/19 at 17:30


Alprazolam (Xanax) 0.25 mg PRN QHS  PRN PO ANXIETY / AGITATION Last 

administered on 2/6/19at 20:25;  Start 2/4/19 at 17:30


Diphenhydramine HCl (Benadryl) 25 mg QHS PO  Last administered on 2/6/19at 20:26

;  Start 2/4/19 at 21:00


Docusate Sodium (Colace) 100 mg DAILY PO  Last administered on 2/7/19at 08:08;  

Start 2/5/19 at 09:00


Acetaminophen/ Hydrocodone Bitart (Lortab 5/325) 1 tab PRN Q6HRS  PRN PO 

MODERATE PAIN Last administered on 2/7/19at 08:10;  Start 2/4/19 at 17:30


Insulin Glargine (Lantus) 17 units DAILYWBKFT SQ  Last administered on 2/7/19at 

08:24;  Start 2/5/19 at 08:00


Isosorbide Mononitrate (Imdur) 30 mg DAILY PO  Last administered on 2/7/19at 08:

09;  Start 2/5/19 at 09:00


Bumetanide (Bumex) 4 mg DAILY PO  Last administered on 2/7/19at 08:11;  Start 2/ 5/19 at 09:00


Vitamin D (Vitamin D3) 1,000 unit DAILY PO  Last administered on 2/7/19at 08:10

;  Start 2/5/19 at 09:00


Insulin Human Lispro (HumaLOG) 4 units TIDWMEALS SQ  Last administered on 2/7/ 19at 08:23;  Start 2/4/19 at 18:00


Methotrexate (Rheumatrex) 15 mg Th PO ;  Start 2/14/19 at 09:00;  Stop 2/14/19 

at 09:00;  Status DC


Pantoprazole Sodium (Protonix) 40 mg DAILYAC PO  Last administered on 2/7/19at 

08:08;  Start 2/5/19 at 07:30


Simvastatin (Zocor) 40 mg QHS PO  Last administered on 2/6/19at 20:26;  Start 2/ 4/19 at 21:00


Ceftriaxone Sodium (Rocephin) 1 gm Q24H IVP  Last administered on 2/4/19at 21:40

;  Start 2/4/19 at 21:00;  Stop 2/5/19 at 11:06;  Status DC


Leucovorin Calcium (Wellcovorin) 15 mg Q6HRS PO  Last administered on 2/7/19at 

05:42;  Start 2/5/19 at 12:00


Ampicillin Sodium/ Sulbactam Sodium 3 gm/Sodium Chloride 100 ml @  200 mls/hr 

Q6HRS IV  Last administered on 2/7/19at 05:36;  Start 2/5/19 at 12:00


Multi-Ingredient Mouthwash/Gargle (Magic Mouthwash) 10 ml PRN QID  PRN PO MOUTH 

PAIN Last administered on 2/6/19at 12:15;  Start 2/6/19 at 08:30


Ondansetron HCl (Zofran) 4 mg PRN Q4HRS  PRN IV NAUSEA/VOMITING Last 

administered on 2/7/19at 08:38;  Start 2/7/19 at 08:45


Iohexol (Omnipaque 300 Mg/ml) 75 ml 1X  ONCE IV  Last administered on 2/7/19at 

10:00;  Start 2/7/19 at 10:00;  Stop 2/7/19 at 10:01;  Status DC


Info (CONTRAST GIVEN -- Rx MONITORING) 1 each PRN DAILY  PRN MC SEE COMMENTS;  

Start 2/7/19 at 09:45;  Stop 2/9/19 at 09:44





Active Scripts


Active


Reported


Hydrocodone-Apap 5-325  ** (Hydrocodone Bit/Acetaminophen) 1 Tab Tablet 1 Tab 

PO PRN Q6HRS PRN


Xanax (Alprazolam) 0.25 Mg Tablet 0.25 Mg PO HS PRN


Basaglar Kwikpen U-100 (Insulin Glargine,Hum.rec.anlog) 100 Unit/1 Ml 

Insuln.pen 17 Unit SQ DAILYWBKFT


Methotrexate (Methotrexate Sodium) 2.5 Mg Tablet 6 Tab PO WEEKLY ON THURSDAY


Benadryl (Diphenhydramine Hcl) 25 Mg Capsule 1 Cap PO QHS


Bumetanide 2 Mg Tablet 2 Tab PO DAILY


Isosorbide Mononitrate Er (Isosorbide Mononitrate) 30 Mg Tab.er.24h 30 Mg PO 

DAILY


Prilosec Otc (Omeprazole Magnesium) 20 Mg Tablet.dr 20 Mg PO DAILY


Novolog Flexpen (Insulin Aspart) 100 Unit/1 Ml Insuln.pen 4 Unit SQ TIDBFRMEAL


Vitamin D-3 (Cholecalciferol (Vitamin D3)) 2,000 Unit Capsule 1,000 Unit PO 

DAILY


Simvastatin 40 Mg Tablet 40 Mg PO DAILY


Colace (Docusate Sodium) 100 Mg Capsule 100 Mg PO DAILY


Vitals/I & O





Vital Sign - Last 24 Hours








 2/6/19 2/6/19 2/6/19 2/6/19





 15:15 19:34 20:00 20:26


 


Temp 98.6 98.7  





 98.6 98.7  


 


Pulse 104 83  


 


Resp 17 20  


 


B/P (MAP) 110/36 (60) 124/45 (71)  


 


Pulse Ox 97 98  


 


O2 Delivery Room Air Room Air Room Air Room Air


 


    





    





 2/6/19 2/7/19 2/7/19 2/7/19





 23:26 02:38 03:49 07:11


 


Temp 98.2  98.1 98.9





 98.2  98.1 98.9


 


Pulse 53  90 93


 


Resp 18  16 18


 


B/P (MAP) 137/50 (79)  124/44 (70) 122/62 (82)


 


Pulse Ox 96  93 92


 


O2 Delivery Room Air Room Air Room Air Room Air


 


    





    





 2/7/19 2/7/19 2/7/19 2/7/19





 08:09 09:10 11:00 11:07


 


Temp   98.1 





   98.1 


 


Pulse 93  107 


 


Resp   16 


 


B/P (MAP) 122/62  139/55 (83) 


 


Pulse Ox   98 


 


O2 Delivery  Room Air Room Air Room Air














Intake and Output   


 


 2/6/19 2/6/19 2/7/19





 15:01 23:01 07:01


 


Intake Total 500 ml 390 ml 490 ml


 


Output Total   300 ml


 


Balance 500 ml 390 ml 190 ml











Nutrition Consultation


Dietary Evaluation:


Recommendations by RD:  Increase Calorie Intake, Protein supplementation


Comments:  


send ensure puddings and ensure enlive shakes with meals


Expected Outcomes/Goals:  


to meet > 75% est nutr needs





Malnutrition Findings:


Food and Nutrition Intake (Sev:  <50% est energy req 5days


Body Fat Depletion (Non Severe:  Mild Depletion


Weight Status:  Obese











HERBERTH STEWART MD Feb 7, 2019 12:46

## 2019-02-08 NOTE — PDOC
MARILIN CROWE APRN 2/8/19 0934:


SUBJECTIVE


Subjective


Patient's urine is varying between a light red and a normal yellow color with 

no dysuria or problems emptying.  Patient was just told that her cancer is 

spreading and not likely to be cured.  She is not sure where she wants to go 

from here. May not want the cysto but is ok with keeping the appointment and 

thinking about if for now.  She will let us know for sure on Monday.  Ok with 

social work consult to discuss transportation/available senior services.





OBJECTIVE


Objective


Physical Exam: 


General appearance: Alert and Oriented


Head: Normocephalic, without obvious abnormality


Eyes: conjunctivae/corneas clear. PERRL, EOM's intact. Fundi benign


Lungs: Regular respirations, non labored breathing


Vital Signs





Vital Signs








  Date Time  Temp Pulse Resp B/P (MAP) Pulse Ox O2 Delivery O2 Flow Rate FiO2


 


2/8/19 07:00 97.0 99 16 131/52 (78) 90 Room Air  





 97.0       


 


2/8/19 03:35 97.0 95 16 134/47 (76) 92 Room Air  





 97.0       


 


2/8/19 02:33      Room Air  


 


2/8/19 01:33      Room Air  


 


2/7/19 23:55 98.9 86 16 119/72 (88) 93 Room Air  





 98.9       


 


2/7/19 20:00      Room Air  


 


2/7/19 19:40 99.0 86 20 118/79 (92) 98 Room Air  





 99.0       


 


2/7/19 19:40      Room Air  


 


2/7/19 15:00 98.6 90 18 119/72 (88) 96 Room Air  





 98.6       


 


2/7/19 11:07      Room Air  


 


2/7/19 11:00 98.1 107 16 139/55 (83) 98 Room Air  





 98.1       








I & O











Intake and Output 


 


 2/8/19





 07:01


 


Intake Total 780 ml


 


Output Total 1400 ml


 


Balance -620 ml


 


 


 


Intake Oral 780 ml


 


Output Urine Total 1400 ml


 


# Voids 2











PHYSICAL EXAM


Physical Exam


Physical Exam: 


General appearance: Alert and Oriented


Head: Normocephalic, without obvious abnormality


Eyes: conjunctivae/corneas clear. PERRL, EOM's intact. Fundi benign


Lungs: Regular respirations, non labored breathing





ASSESSMENT/PLAN


Assessment/Plan


Patient [place] an [place] [facility] [place] at 

1040 am. Appointment card and new patient paperwork given to patient.  All 

questions answered. Pt will let us know for sure if she wants to keep this 

appointment or not on Monday.


Social work consult entered for transportation concerns. 


Continue antibiotics for UTI.


Will follow peripherally over the weekend but please call with questions or 

changes in patient condition and we will happily address them.


Problems:  


(1) Gross hematuria





COMMENT


Lab





Laboratory Tests








Test


 2/7/19


11:49 2/7/19


16:53 2/7/19


20:38 2/8/19


07:16


 


Glucose (Fingerstick)


 110 mg/dL


(70-99) 141 mg/dL


(70-99) 150 mg/dL


(70-99) 71 mg/dL


(70-99)











Nutrition Consultation


Dietary Evaluation:


Recommendations by RD:  Increase Calorie Intake, Protein supplementation


Comments:  


send ensure puddings and ensure enlive shakes with meals


Expected Outcomes/Goals:  


to meet > 75% est nutr needs





Malnutrition Findings:


Food and Nutrition Intake (Sev:  <50% est energy req 5days


Body Fat Depletion (Non Severe:  Mild Depletion


Weight Status:  Obese





ROBBIE KENDRICK MD 2/9/19 1451:


ASSESSMENT/PLAN


Assessment/Plan


Agree with assessment and plan.











MARILIN CROWE Feb 8, 2019 09:34


ROBBIE KENDRICK MD Feb 9, 2019 14:51

## 2019-02-08 NOTE — PDOC
PROGRESS NOTES


Subjective


Subjective


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 

diagnosed on


08/09/2018.





ROS - no fever





Objective


Objective





Vital Signs








  Date Time  Temp Pulse Resp B/P (MAP) Pulse Ox O2 Delivery O2 Flow Rate FiO2


 


2/8/19 07:00 97.0 99 16 131/52 (78) 90 Room Air  





 97.0       














Intake and Output 


 


 2/8/19





 07:01


 


Intake Total 780 ml


 


Output Total 1400 ml


 


Balance -620 ml


 


 


 


Intake Oral 780 ml


 


Output Urine Total 1400 ml


 


# Voids 2











Physical Exam


Heart:  Normal S1, Normal S2


General:  Alert, Oriented X3


Lungs:  Clear to auscultation


Neuro:  Normal speech


Psych/Mental Status:  Mental status NL





Assessment


Assessment


IMPRESSION AND PLAN:





1.  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


08/09/2018.  Completed chemoradiation therapy on 10/24/2018. 





CT 2/7/19 reveals Multiple pulmonary nodules, have increased in size and number 

since 


prior study, compatible with metastatic disease.


- Areas of subtle hepatic hypodensity, at least one of which may have 


progressed, but difficult to characterize on this exam.


- Thickening of the distal esophagus appears similar.





I will order PD-L1 test to see if she would qualify for immunotherapy. 





2.  Oral mucositis could be from methotrexate.  Continue to monitor.


Ordered MAGIC MOUTHWASH 2/6/19.





3.  Rheumatoid arthritis.  She is on methotrexate, which will be held because of


oral mucositis.





4.  Atrial fibrillation.  Continue management per Cardiology.  Xarelto was kept


on hold by Dr. Bernardo because of bleeding from oral mucosa and hematuria.





5.  History of cerebrovascular accident.





6.  Anemia.  Hemoglobin is worse at 8.9 on 02/04/2019 and 8.0 on 2/7/19.  

Continue to monitor.





Comment


Review of Relevant


I have reviewed the following items claudio (where applicable) has been applied.


Labs





Laboratory Tests








Test


 2/6/19


11:42 2/6/19


17:23 2/6/19


20:25 2/7/19


05:30


 


Glucose (Fingerstick)


 201 mg/dL


(70-99) 152 mg/dL


(70-99) 113 mg/dL


(70-99) 





 


White Blood Count


 


 


 


 2.3 x10^3/uL


(4.0-11.0)


 


Red Blood Count


 


 


 


 2.32 x10^6/uL


(3.50-5.40)


 


Hemoglobin


 


 


 


 8.0 g/dL


(12.0-15.5)


 


Hematocrit


 


 


 


 23.7 %


(36.0-47.0)


 


Mean Corpuscular Volume


 


 


 


 102 fL


()


 


Mean Corpuscular Hemoglobin    35 pg (25-35) 


 


Mean Corpuscular Hemoglobin


Concent 


 


 


 34 g/dL


(31-37)


 


Red Cell Distribution Width


 


 


 


 24.8 %


(11.5-14.5)


 


Platelet Count


 


 


 


 175 x10^3/uL


(140-400)


 


Neutrophils (%) (Auto)    69 % (31-73) 


 


Lymphocytes (%) (Auto)    15 % (24-48) 


 


Monocytes (%) (Auto)    7 % (0-9) 


 


Eosinophils (%) (Auto)    8 % (0-3) 


 


Basophils (%) (Auto)    1 % (0-3) 


 


Neutrophils # (Auto)


 


 


 


 1.6 x10^3uL


(1.8-7.7)


 


Lymphocytes # (Auto)


 


 


 


 0.3 x10^3/uL


(1.0-4.8)


 


Monocytes # (Auto)


 


 


 


 0.2 x10^3/uL


(0.0-1.1)


 


Eosinophils # (Auto)


 


 


 


 0.2 x10^3/uL


(0.0-0.7)


 


Basophils # (Auto)


 


 


 


 0.0 x10^3/uL


(0.0-0.2)


 


Test


 2/7/19


06:51 2/7/19


11:49 2/7/19


16:53 2/7/19


20:38


 


Glucose (Fingerstick)


 87 mg/dL


(70-99) 110 mg/dL


(70-99) 141 mg/dL


(70-99) 150 mg/dL


(70-99)


 


Test


 2/8/19


07:16 


 


 





 


Glucose (Fingerstick)


 71 mg/dL


(70-99) 


 


 











Laboratory Tests








Test


 2/7/19


11:49 2/7/19


16:53 2/7/19


20:38 2/8/19


07:16


 


Glucose (Fingerstick)


 110 mg/dL


(70-99) 141 mg/dL


(70-99) 150 mg/dL


(70-99) 71 mg/dL


(70-99)








Microbiology


2/4/19 Urine Culture - Final, Complete


         


2/4/19 Urine Culture Result 1 (HARRY) - Final, Complete


         


2/4/19 Antimicrobic Susceptibility - Final, Complete


Medications





Current Medications


Dextrose (Dextrose 50%-Water Syringe) 12.5 gm PRN Q15MIN  PRN IV SEE COMMENTS;  

Start 2/4/19 at 17:00


Sodium Chloride 1,000 ml @  75 mls/hr [id number] IV  Last administered on 2/8/19at 

05:18;  Start 2/4/19 at 17:30


Alprazolam (Xanax) 0.25 mg PRN QHS  PRN PO ANXIETY / AGITATION Last 

administered on 2/7/19at 19:39;  Start 2/4/19 at 17:30


Diphenhydramine HCl (Benadryl) 25 mg QHS PO  Last administered on 2/7/19at 19:40

;  Start 2/4/19 at 21:00


Docusate Sodium (Colace) 100 mg DAILY PO  Last administered on 2/7/19at 08:08;  

Start 2/5/19 at 09:00


Acetaminophen/ Hydrocodone Bitart (Lortab 5/325) 1 tab PRN Q6HRS  PRN PO 

MODERATE PAIN Last administered on 2/8/19at 01:33;  Start 2/4/19 at 17:30


Insulin Glargine (Lantus) 17 units DAILYWBKFT SQ  Last administered on 2/7/19at 

08:24;  Start 2/5/19 at 08:00


Isosorbide Mononitrate (Imdur) 30 mg DAILY PO  Last administered on 2/7/19at 08:

09;  Start 2/5/19 at 09:00


Bumetanide (Bumex) 4 mg DAILY PO  Last administered on 2/7/19at 08:11;  Start 2/ 5/19 at 09:00


Vitamin D (Vitamin D3) 1,000 unit DAILY PO  Last administered on 2/7/19at 08:10

;  Start 2/5/19 at 09:00


Insulin Human Lispro (HumaLOG) 4 units TIDWMEALS SQ  Last administered on 2/7/ 19at 18:08;  Start 2/4/19 at 18:00


Methotrexate (Rheumatrex) 15 mg Th PO ;  Start 2/14/19 at 09:00;  Stop 2/14/19 

at 09:00;  Status DC


Pantoprazole Sodium (Protonix) 40 mg DAILYAC PO  Last administered on 2/7/19at 

08:08;  Start 2/5/19 at 07:30


Simvastatin (Zocor) 40 mg QHS PO  Last administered on 2/7/19at 19:39;  Start 2/ 4/19 at 21:00


Ceftriaxone Sodium (Rocephin) 1 gm Q24H IVP  Last administered on 2/4/19at 21:40

;  Start 2/4/19 at 21:00;  Stop 2/5/19 at 11:06;  Status DC


Leucovorin Calcium (Wellcovorin) 15 mg Q6HRS PO  Last administered on 2/8/19at 

05:22;  Start 2/5/19 at 12:00


Ampicillin Sodium/ Sulbactam Sodium 3 gm/Sodium Chloride 100 ml @  200 mls/hr 

Q6HRS IV  Last administered on 2/8/19at 05:20;  Start 2/5/19 at 12:00


Multi-Ingredient Mouthwash/Gargle (Magic Mouthwash) 10 ml PRN QID  PRN PO MOUTH 

PAIN Last administered on 2/6/19at 12:15;  Start 2/6/19 at 08:30


Ondansetron HCl (Zofran) 4 mg PRN Q4HRS  PRN IV NAUSEA/VOMITING Last 

administered on 2/7/19at 08:38;  Start 2/7/19 at 08:45


Iohexol (Omnipaque 300 Mg/ml) 75 ml 1X  ONCE IV  Last administered on 2/7/19at 

10:00;  Start 2/7/19 at 10:00;  Stop 2/7/19 at 10:01;  Status DC


Info (CONTRAST GIVEN -- Rx MONITORING) 1 each PRN DAILY  PRN MC SEE COMMENTS;  

Start 2/7/19 at 09:45;  Stop 2/9/19 at 09:44





Active Scripts


Active


Reported


Hydrocodone-Apap 5-325  ** (Hydrocodone Bit/Acetaminophen) 1 Tab Tablet 1 Tab 

PO PRN Q6HRS PRN


Xanax (Alprazolam) 0.25 Mg Tablet 0.25 Mg PO HS PRN


Basaglar Kwikpen U-100 (Insulin Glargine,Hum.rec.anlog) 100 Unit/1 Ml 

Insuln.pen 17 Unit SQ DAILYWBKFT


Methotrexate (Methotrexate Sodium) 2.5 Mg Tablet 6 Tab PO WEEKLY ON THURSDAY


Benadryl (Diphenhydramine Hcl) 25 Mg Capsule 1 Cap PO QHS


Bumetanide 2 Mg Tablet 2 Tab PO DAILY


Isosorbide Mononitrate Er (Isosorbide Mononitrate) 30 Mg Tab.er.24h 30 Mg PO 

DAILY


Prilosec Otc (Omeprazole Magnesium) 20 Mg Tablet.dr 20 Mg PO DAILY


Novolog Flexpen (Insulin Aspart) 100 Unit/1 Ml Insuln.pen 4 Unit SQ TIDBFRMEAL


Vitamin D-3 (Cholecalciferol (Vitamin D3)) 2,000 Unit Capsule 1,000 Unit PO 

DAILY


Simvastatin 40 Mg Tablet 40 Mg PO DAILY


Colace (Docusate Sodium) 100 Mg Capsule 100 Mg PO DAILY


Vitals/I & O





Vital Sign - Last 24 Hours








 2/7/19 2/7/19 2/7/19 2/7/19





 11:00 11:07 15:00 19:40


 


Temp 98.1  98.6 





 98.1  98.6 


 


Pulse 107  90 


 


Resp 16  18 


 


B/P (MAP) 139/55 (83)  119/72 (88) 


 


Pulse Ox 98  96 


 


O2 Delivery Room Air Room Air Room Air Room Air


 


    





    





 2/7/19 2/7/19 2/7/19 2/8/19





 19:40 20:00 23:55 01:33


 


Temp 99.0  98.9 





 99.0  98.9 


 


Pulse 86  86 


 


Resp 20  16 


 


B/P (MAP) 118/79 (92)  119/72 (88) 


 


Pulse Ox 98  93 


 


O2 Delivery Room Air Room Air Room Air Room Air


 


    





    





 2/8/19 2/8/19 2/8/19 





 02:33 03:35 07:00 


 


Temp  97.0 97.0 





  97.0 97.0 


 


Pulse  95 99 


 


Resp  16 16 


 


B/P (MAP)  134/47 (76) 131/52 (78) 


 


Pulse Ox  92 90 


 


O2 Delivery Room Air Room Air Room Air 














Intake and Output   


 


 2/7/19 2/7/19 2/8/19





 15:01 23:01 07:01


 


Intake Total 300 ml 480 ml 


 


Output Total 800 ml  600 ml


 


Balance -500 ml 480 ml -600 ml











Nutrition Consultation


Dietary Evaluation:


Recommendations by RD:  Increase Calorie Intake, Protein supplementation


Comments:  


send ensure puddings and ensure enlive shakes with meals


Expected Outcomes/Goals:  


to meet > 75% est nutr needs





Malnutrition Findings:


Food and Nutrition Intake (Sev:  <50% est energy req 5days


Body Fat Depletion (Non Severe:  Mild Depletion


Weight Status:  Obese











HERBERTH STEWART MD Feb 8, 2019 09:18

## 2019-02-08 NOTE — PDOC
Infectious Disease Note


Subjective


Subjective


feeling slightly better





ROS


ROS


no n/v/d/





Vital Sign


Vital Signs





Vital Signs








  Date Time  Temp Pulse Resp B/P (MAP) Pulse Ox O2 Delivery O2 Flow Rate FiO2


 


2/8/19 11:00 97.0 92 18 128/41 (70) 93 Room Air  





 97.0       











Physical Exam


PHYSICAL EXAM


VITAL SIGNS:  Stable, afebrile.


HEENT:  Pupils are round and reacting.  No conjunctival lesion.  Extensive oral


ulceration present, very large with exudate and very tender.  It does not have


typical appearance of herpetic ulceration or aphthous ulcer or stomatitis from


chemotherapy, which anyway she is too far out from chemotherapy.


NECK:  Supple, no JVP, no lymphadenopathy.


LUNGS:  Clear.


HEART:  S1, S2 regular.


ABDOMEN:  Benign.


EXTREMITIES:  No edema, cyanosis.


SKIN:  Unremarkable.  The patient is neurologically intact.





Labs


Lab





Laboratory Tests








Test


 2/7/19


11:49 2/7/19


16:53 2/7/19


20:38 2/8/19


07:16


 


Glucose (Fingerstick)


 110 mg/dL


(70-99) 141 mg/dL


(70-99) 150 mg/dL


(70-99) 71 mg/dL


(70-99)


 


Test


 2/8/19


10:54 


 


 





 


Glucose (Fingerstick)


 138 mg/dL


(70-99) 


 


 











Micro





Microbiology


2/4/19 Urine Culture - Preliminary, Resulted


         


2/4/19 Urine Culture Result 1 (HARRY) - Preliminary, Resulted





Objective


Assessment


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


Hematuria, ? from Methotrexate vs other


H/O Esophageal ca s/p chemo radiation


RA


H/O Lung ca


Dehydration


Elevated LFT sec to Methotrexate


Urine with e coli





Plan


Plan of Care





Leukovorin


fluids


unasyn,, change to po augmentin


ct noted











WAI TESFAYE MD Feb 8, 2019 11:37

## 2019-02-08 NOTE — NUR
SW consulted to assist with transportation/finance. SW provided pt with [facility] area of aging 
information. Pt accepted resources.

## 2019-02-09 NOTE — PDOC
Infectious Disease Note


Subjective


Subjective


Mouth ulcers better, able to wear dentures, but lower lip ulcers still pretty 

sore


Also reports ongoing left leg pain and bruising after she fell couple of weeks 

ago


Walking some 


Denies F/C/S/N/V/D/SOA





ROS


ROS


per HPI otherwise neg





Vital Sign


Vital Signs





Vital Signs








  Date Time  Temp Pulse Resp B/P (MAP) Pulse Ox O2 Delivery O2 Flow Rate FiO2


 


2/9/19 09:08  89  122/43    


 


2/9/19 08:00      Room Air  


 


2/9/19 07:10 98.6  18  93   





 98.6       











Physical Exam


PHYSICAL EXAM


GENERAL Sitting in the chair, alert, NAD 


HEENT:  Oral mucosal ulcerations, improving


NECK:  Supple,


LUNGS:  Clear.


HEART:  S1, S2 regular.


ABDOMEN:  Soft and nontender


EXTREMITIES:  No edema or cyanosis. Left anterior leg, below the knee is bruised

, swollen, warm and tender. 


SKIN:  No rash


CNS: Alert and oriented


Port clean





Labs


Lab





Laboratory Tests








Test


 2/8/19


16:34 2/8/19


20:30 2/9/19


07:23


 


Glucose (Fingerstick)


 163 mg/dL


(70-99) 79 mg/dL


(70-99) 91 mg/dL


(70-99)








Micro





Microbiology


2/4/19 Urine Culture - Final, Complete


         


2/4/19 Urine Culture Result 1 (HARRY) - Final, Complete


         


2/4/19 Antimicrobic Susceptibility - Final, Complete





Objective


Assessment


Extensive ulcerative stomatitis, likely Methotrexate toxicity - improving


LLE hematoma. h/o fall about 2 weeks ago. 


Hematuria, ? from Methotrexate vs other. Has a cystoscopy with Dr. Luke of 

[facility] on 2/28/19 


H/O Esophageal ca s/p chemo radiation. CT scan compatible with metastatic 

disease.


RA


H/O Lung ca


Dehydration


Elevated LFT sec to Methotrexate


Urine with e coli (2/4) 


A-fib





Plan


Plan of Care


cont  Augmentin (2/8), was on unasyn


Leucovorin


fluids


patient wants upcoming appt with Dr. Robertson cancelled.


D/w RN 2/8





Patient seen, examined, I agree with above assessment and plan











TARUN SWANSON Feb 9, 2019 11:18


LYNETTE TESFAYE MD Feb 9, 2019 14:47

## 2019-02-10 NOTE — PDOC
PROGRESS NOTE


SUBJECTIVE:


ROS:


ROS:





RESPIRATORY: Shortness of breath denies. Cough denies.


UROLOGY: Denies blood in urine. Denies difficulty urinating


HPI:


No longer having hematuria, urine is now yellow.


She has no  complaints at this time.





OBJECTIVE:


Vital Signs:





Vital Signs








  Date Time  Temp Pulse Resp B/P (MAP) Pulse Ox O2 Delivery O2 Flow Rate FiO2


 


2/10/19 19:36 98.7 98 20 118/45 (69) 95 Room Air  





 98.7       


 


2/10/19 14:00 97.8 67 16 130/50 (76) 98 Room Air  





 97.8       


 


2/10/19 11:00 98.4 93 16 126/46 (72) 97 Room Air  





 98.4       


 


2/10/19 10:21  97  127/56    


 


2/10/19 08:00      Room Air  


 


2/10/19 07:00 98.6 97 16 127/56 (79) 96 Room Air  





 98.6       


 


2/10/19 04:24      Room Air  


 


2/10/19 03:41 99.1 89 18 137/47 (77) 92 Room Air  





 99.1       


 


2/10/19 03:24      Room Air  


 


2/9/19 23:43 98.3 81 18 132/45 (74) 94 Room Air  





 98.3       








I & O











Intake and Output 


 


 2/10/19





 07:01


 


Intake Total 790 ml


 


Output Total 600 ml


 


Balance 190 ml


 


 


 


Intake Oral 790 ml


 


Output Urine Total 600 ml











PHYSICAL EXAM:


Physical Exam:


General: Pleasant, no acute distress, well groomed


Respiratory: unlabored breathing


Psych: normal mood, affect. Alert and oriented x 3.





LABS:





Laboratory Tests








Test


 2/8/19


07:16 2/8/19


10:54 2/8/19


16:34 2/8/19


20:30


 


Glucose (Fingerstick)


 71 mg/dL


(70-99) 138 mg/dL


(70-99) 163 mg/dL


(70-99) 79 mg/dL


(70-99)


 


Test


 2/9/19


07:23 2/9/19


11:44 2/9/19


17:09 2/9/19


20:52


 


Glucose (Fingerstick)


 91 mg/dL


(70-99) 156 mg/dL


(70-99) 137 mg/dL


(70-99) 104 mg/dL


(70-99)


 


Test


 2/10/19


07:20 2/10/19


12:23 2/10/19


17:03 





 


Glucose (Fingerstick)


 84 mg/dL


(70-99) 156 mg/dL


(70-99) 150 mg/dL


(70-99) 














Microbiology


2/4/19 Urine Culture - Final, Complete


         


2/4/19 Urine Culture Result 1 (HARRY) - Final, Complete


         


2/4/19 Antimicrobic Susceptibility - Final, Complete





MEDICATIONS:





Current Medications








 Medications


  (Trade)  Dose


 Ordered  Sig/Gerry  Start Time


 Stop Time Status Last Admin


Dose Admin


 


 Acetaminophen/


 Hydrocodone Bitart


  (Lortab 5/325)  1 tab  PRN Q6HRS  PRN  2/4/19 17:30


    2/10/19 03:24


1 TAB


 


 Alprazolam


  (Xanax)  0.25 mg  PRN QHS  PRN  2/4/19 17:30


    2/9/19 19:50


0.25 MG


 


 Amoxicillin/


 Clavulanate


 Potassium


  (Augmentin 875/


 125mg)  1 tab  BID  2/8/19 21:00


    2/10/19 10:18


1 TAB


 


 Ampicillin Sodium/


 Sulbactam Sodium


 3 gm/Sodium


 Chloride  100 ml @ 


 200 mls/hr  Q6HRS  2/5/19 12:00


 2/8/19 11:37 DC 2/8/19 05:20


200 MLS/HR


 


 Artificial Tears


  (Artificial


 Tears)  1 drop  PRN Q15MIN  PRN  2/9/19 15:30


     





 


 Bumetanide


  (Bumex)  4 mg  DAILY  2/5/19 09:00


    2/10/19 10:19


4 MG


 


 Ceftriaxone Sodium


  (Rocephin)  1 gm  Q24H  2/4/19 21:00


 2/5/19 11:06 DC 2/4/19 21:40


1 GM


 


 Dextrose


  (Dextrose


 50%-Water Syringe)  12.5 gm  PRN Q15MIN  PRN  2/4/19 17:00


     





 


 Diphenhydramine


 HCl


  (Benadryl)  25 mg  QHS  2/4/19 21:00


    2/9/19 19:55


25 MG


 


 Docusate Sodium


  (Colace)  100 mg  DAILY  2/5/19 09:00


    2/10/19 10:19


100 MG


 


 Info


  (CONTRAST GIVEN


 -- Rx MONITORING)  1 each  PRN DAILY  PRN  2/7/19 09:45


 2/9/19 09:44 DC  





 


 Insulin Glargine


  (Lantus)  17 units  DAILYWBKFT  2/5/19 08:00


    2/10/19 10:17


17 UNITS


 


 Insulin Human


 Lispro


  (HumaLOG)  4 units  TIDWMEALS  2/4/19 18:00


    2/10/19 18:11


4 UNITS


 


 Iohexol


  (Omnipaque 300


 Mg/ml)  75 ml  1X  ONCE  2/7/19 10:00


 2/7/19 10:01 DC 2/7/19 10:00


75 ML


 


 Isosorbide


 Mononitrate


  (Imdur)  30 mg  DAILY  2/5/19 09:00


    2/10/19 10:21


30 MG


 


 Leucovorin Calcium


  (Wellcovorin)  15 mg  Q6HRS  2/5/19 12:00


    2/10/19 18:06


15 MG


 


 Methotrexate


  (Rheumatrex)  15 mg  Th  2/14/19 09:00


 2/14/19 09:00 DC  





 


 Multi-Ingredient


 Mouthwash/Gargle


  (Magic Mouthwash)  10 ml  PRN QID  PRN  2/6/19 08:30


    2/6/19 12:15


10 ML


 


 Multivitamins/


 Minerals


  (I-Josias)  1 tab  BID  2/9/19 10:00


    2/10/19 10:19


1 TAB


 


 Ondansetron HCl


  (Zofran)  4 mg  PRN Q4HRS  PRN  2/7/19 08:45


    2/7/19 08:38


4 MG


 


 Pantoprazole


 Sodium


  (Protonix)  40 mg  DAILYAC  2/5/19 07:30


    2/10/19 07:40


40 MG


 


 Simvastatin


  (Zocor)  40 mg  QHS  2/4/19 21:00


    2/9/19 19:51


40 MG


 


 Sodium Chloride  1,000 ml @ 


 75 mls/hr  [id number]  2/4/19 17:30


    2/10/19 07:42


75 MLS/HR


 


 Vitamin D


  (Vitamin D3)  1,000 unit  DAILY  2/5/19 09:00


    2/10/19 10:19


1,000 UNIT











ASSESSMENT & PLAN


Will sign off, hematuria has resolved. Was probably due to E coli UTI - 

management per ID.


Discussed outpatient cystoscopy to rule out bladder cancer - she declines due 

ongoing comorbidities, likely starting hospice soon.











ROBBIE KENDRICK MD Feb 10, 2019 20:58

## 2019-02-10 NOTE — PDOC
Infectious Disease Note


Subjective


Subjective


Tired this morning, didn't sleep well last night


Denies F/C/S/N/V/D/SOA





ROS


ROS


per HPI





Vital Sign


Vital Signs





Vital Signs








  Date Time  Temp Pulse Resp B/P (MAP) Pulse Ox O2 Delivery O2 Flow Rate FiO2


 


2/10/19 07:00 98.6 97 16 127/56 (79) 96 Room Air  





 98.6       











Physical Exam


PHYSICAL EXAM


GENERAL: Resting quietly 


HEENT:  Oral mucosal ulcerations, improving


NECK:  Supple,


LUNGS:  Clear.


HEART:  S1, S2 regular.


ABDOMEN:  Soft and nontender


EXTREMITIES:  No edema or cyanosis. Left anterior leg, below the knee is bruised

, swollen, warm and tender. 


SKIN:  No rash


CNS: Arouses to gentle stimuli, responds appropriately 


Port clean





Labs


Lab





Laboratory Tests








Test


 2/9/19


11:44 2/9/19


17:09 2/9/19


20:52


 


Glucose (Fingerstick)


 156 mg/dL


(70-99) 137 mg/dL


(70-99) 104 mg/dL


(70-99)








Micro





Microbiology


2/4/19 Urine Culture - Final, Complete


         


2/4/19 Urine Culture Result 1 (HARRY) - Final, Complete


         


2/4/19 Antimicrobic Susceptibility - Final, Complete





Objective


Assessment


Extensive ulcerative stomatitis, likely Methotrexate toxicity - improving


LLE hematoma. h/o fall about 2 weeks ago. 


Hematuria, ? from Methotrexate vs other. Has a cystoscopy with Dr. Luke of 

[facility] [place] on 2/28/19 


H/O Esophageal ca s/p chemo radiation. CT scan compatible with metastatic 

disease.


RA


H/O Lung ca


Dehydration


Elevated LFT sec to Methotrexate


Urine with e coli (2/4) 


A-fib





Plan


Plan of Care


cont  Augmentin (2/8), was on Unasyn, wean soon 


Leucovorin


fluids


patient wants upcoming appt with Dr. Robertson cancelled.


D/w RN 





Patient seen, examined, I agree with above a/p











TARUN SWANSON Feb 10, 2019 09:12


LYNETTE TESFAYE MD Feb 10, 2019 14:07

## 2019-02-10 NOTE — PN
DATE:  02/10/2019



PATIENT LOCATION:  Room 663.



SUBJECTIVE:  The patient is awake, alert, did not sleep well last night and

feels like she is doing a lot of thinking.



OBJECTIVE:

VITAL SIGNS:  Stable.  She is afebrile.

HEENT:  She does admit to a new mouth ulcer on her right upper inner lip area

and it is examined and appears same as the others.  Mouth is becoming slightly

less tender to take in p.o.  Sugars are excellent.

CHEST:  Reveals diminished breath sounds, but clear.

HEART:  Regular rate and rhythm.

ABDOMEN:  Benign.



Ongoing IV antibiotics for E. coli urinary tract infection.  No further

hematuria reported.



IMPRESSION:

1.  Stomatitis with slow improvement, with a new mouth lesion today felt due to

methotrexate.

2.  Urinary tract infection with Escherichia coli with hematuria, improving.

3.  Metastatic esophageal cancer.

4.  Diabetes.



PLAN:  Continue present care, begin to mobilize.  Anticipate discharge in the

next couple of days to home and I have asked hospice to come to have

informational visit.

 



______________________________

MARSHALL RICHARDS MD



DR:  [email or web address]/tu  JOB#:  [id number] / [id number]

DD:  02/10/2019 14:58  DT:  02/10/2019 21:54

## 2019-02-10 NOTE — PN
DATE:  02/09/2019



LOCATION:  She is in room 663.



SUBJECTIVE:  The patient is awake, alert, nursing is in attendance.  We did have

a long discussion regarding the findings on her CT and implications of the same

with metastatic adenocarcinoma of the esophagus.  I discussed with her likely

hospice on discharge and she is amenable to the same this morning.



OBJECTIVE:

VITAL SIGNS:  Stable.  She is afebrile.

CHEST:  Clear.

HEART:  Regular.

ABDOMEN:  Benign.



Mouth is slowly healing.  Sugars have been good.  She does have ongoing

antibiotic therapy and methotrexate remains on hold.



IMPRESSION:

1.  Stomatitis felt likely due to methotrexate.

2.  Urinary tract infection with Escherichia coli with hematuria that is

improved.

3.  Metastatic esophageal cancer of the esophagus.

4.  Diabetes.



PLAN:  Continue present care and anticipate discharge first part of the week and

she is starting to take p.o. better at this point in time.  We will attempt one

time looking for hospice on discharge.

 



______________________________

MARSHALL RICHARDS MD



DR:  VENKATA/tu  JOB#:  [id number] / [id number]

DD:  02/09/2019 17:23  DT:  02/10/2019 05:45

## 2019-02-11 NOTE — PN
DATE:  02/11/2019



PATIENT LOCATION:  She is in room 663.



SUBJECTIVE:  The patient is awake, alert, still complaining about her mouth, but

feels like it is definitely improving and feels like she can take enough p.o. at

this point in time to sustain herself.



OBJECTIVE:

VITAL SIGNS:  Stable.  She is afebrile.

HEENT:  Mouth is slowly improving.  Again, mouth looks somewhat better.

CHEST:  Clear.

HEART:  Regular.

ABDOMEN:  Benign.



LABORATORY DATA:  Sugars are good.  INR is down to normal, as expected, with

Xarelto being held and this will be a question for discharge.  She does not see,

with all the bleeding, to be a good candidate for anticoagulation again.



IMPRESSION:

1.  Stomatitis with slow improvement, felt due to methotrexate.

2.  Urinary tract infection with Escherichia coli and hematuria, improving.

3.  Metastatic esophageal cancer.

4.  Diabetes.



PLAN:  Continue to mobilize.  I told her that I anticipate possibly back home

tomorrow depending on ID feelings on the same and how she does today with

physical therapy, as she does live alone.

 



______________________________

MARSHALL RICHARDS MD



DR:  VENKATA/tu  JOB#:  [id number] / [id number]

DD:  02/11/2019 08:25  DT:  02/11/2019 10:29

## 2019-02-11 NOTE — NUR
SACHI following. Dr. Bernardo consulted [facility] to evaluate pt. Cassy from [facility] 
met with pt and pt has signed up with hospice services. SACHI provided Clinicals to Cassy. Dr. Charles has already provided orders for [facility] and a nurse will visit pt at home once 
pt is discharged. SACHI will continue to follow.

## 2019-02-11 NOTE — RAD
Left tibia and fibula, 2 views, 2/11/2019:



History: Pain, bruising and swelling



No fracture or destructive bony lesion is seen.  There is moderate

degenerative change at the knee joint and mild degenerative change at the

ankle joint.  Arterial calcifications are present.  There is moderate diffuse

subcutaneous edema.



IMPRESSION: No acute bony abnormality is detected.

## 2019-02-11 NOTE — PDOC
PROGRESS NOTES


Subjective


Subjective


HPI - f/u of  Stage IV esophageal ca with lung mets per CT 2/7/19





ROS - no CP





Objective


Objective





Vital Signs








  Date Time  Temp Pulse Resp B/P (MAP) Pulse Ox O2 Delivery O2 Flow Rate FiO2


 


2/11/19 06:55 98.9 89 18 134/51 (78) 92 Room Air  





 98.9       














Intake and Output 


 


 2/11/19





 07:01


 


Intake Total 530 ml


 


Output Total 1200 ml


 


Balance -670 ml


 


 


 


Intake Oral 530 ml


 


Output Urine Total 1200 ml


 


# Voids 2











Physical Exam


General:  Alert, Oriented X3, No acute distress


Neuro:  Normal speech


Psych/Mental Status:  Mental status NL





Assessment


Assessment


IMPRESSION AND PLAN:





1.  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 

poorly differentiated adenocarcinoma of the esophagus diagnosed on


08/09/2018).  Completed chemoradiation therapy on 10/24/2018. 





CT 2/7/19 reveals Multiple pulmonary nodules, have increased in size and number 

since 


prior study, compatible with metastatic disease.


- Areas of subtle hepatic hypodensity, at least one of which may have 


progressed, but difficult to characterize on this exam.


- Thickening of the distal esophagus appears similar.





I will order PD-L1 test to see if she would qualify for immunotherapy. 


f/u with me in 1-2 weeks.





2.  Oral mucositis could be from methotrexate.  Continue to monitor.


Ordered MAGIC MOUTHWASH 2/6/19.





3.  Rheumatoid arthritis.  She is on methotrexate, which will be held because of


oral mucositis.





4.  Atrial fibrillation.  Continue management per Cardiology.  Xarelto was kept


on hold by Dr. Bernardo because of bleeding from oral mucosa and hematuria.





5.  History of cerebrovascular accident.





6.  Anemia.  Hemoglobin is worse at 8.9 on 02/04/2019 and 8.0 on 2/7/19.  

Continue to monitor.





Comment


Review of Relevant


I have reviewed the following items claudio (where applicable) has been applied.


Labs





Laboratory Tests








Test


 2/9/19


11:44 2/9/19


17:09 2/9/19


20:52 2/10/19


07:20


 


Glucose (Fingerstick)


 156 mg/dL


(70-99) 137 mg/dL


(70-99) 104 mg/dL


(70-99) 84 mg/dL


(70-99)


 


Test


 2/10/19


12:23 2/10/19


17:03 2/11/19


04:40 2/11/19


07:12


 


Glucose (Fingerstick)


 156 mg/dL


(70-99) 150 mg/dL


(70-99) 


 84 mg/dL


(70-99)


 


Prothrombin Time


 


 


 15.8 SEC


(11.7-14.0) 





 


Prothromb Time International


Ratio 


 


 1.3 (0.8-1.1) 


 











Laboratory Tests








Test


 2/10/19


12:23 2/10/19


17:03 2/11/19


04:40 2/11/19


07:12


 


Glucose (Fingerstick)


 156 mg/dL


(70-99) 150 mg/dL


(70-99) 


 84 mg/dL


(70-99)


 


Prothrombin Time


 


 


 15.8 SEC


(11.7-14.0) 





 


Prothromb Time International


Ratio 


 


 1.3 (0.8-1.1) 


 











Microbiology


2/4/19 Urine Culture - Final, Complete


         


2/4/19 Urine Culture Result 1 (HARYR) - Final, Complete


         


2/4/19 Antimicrobic Susceptibility - Final, Complete


Medications





Current Medications


Dextrose (Dextrose 50%-Water Syringe) 12.5 gm PRN Q15MIN  PRN IV SEE COMMENTS;  

Start 2/4/19 at 17:00


Sodium Chloride 1,000 ml @  75 mls/hr [id number] IV  Last administered on 2/10/

19at 07:42;  Start 2/4/19 at 17:30


Alprazolam (Xanax) 0.25 mg PRN QHS  PRN PO ANXIETY / AGITATION Last 

administered on 2/10/19at 21:00;  Start 2/4/19 at 17:30


Diphenhydramine HCl (Benadryl) 25 mg QHS PO  Last administered on 2/10/19at 21:

00;  Start 2/4/19 at 21:00


Docusate Sodium (Colace) 100 mg DAILY PO  Last administered on 2/10/19at 10:19;

  Start 2/5/19 at 09:00


Acetaminophen/ Hydrocodone Bitart (Lortab 5/325) 1 tab PRN Q6HRS  PRN PO 

MODERATE PAIN Last administered on 2/11/19at 04:38;  Start 2/4/19 at 17:30


Insulin Glargine (Lantus) 17 units DAILYWBKFT SQ  Last administered on 2/10/

19at 10:17;  Start 2/5/19 at 08:00


Isosorbide Mononitrate (Imdur) 30 mg DAILY PO  Last administered on 2/10/19at 10

:21;  Start 2/5/19 at 09:00


Bumetanide (Bumex) 4 mg DAILY PO  Last administered on 2/10/19at 10:19;  Start 2 /5/19 at 09:00


Vitamin D (Vitamin D3) 1,000 unit DAILY PO  Last administered on 2/10/19at 10:19

;  Start 2/5/19 at 09:00


Insulin Human Lispro (HumaLOG) 4 units TIDWMEALS SQ  Last administered on 2/10/

19at 18:11;  Start 2/4/19 at 18:00


Methotrexate (Rheumatrex) 15 mg Th PO ;  Start 2/14/19 at 09:00;  Stop 2/14/19 

at 09:00;  Status DC


Pantoprazole Sodium (Protonix) 40 mg DAILYAC PO  Last administered on 2/10/19at 

07:40;  Start 2/5/19 at 07:30


Simvastatin (Zocor) 40 mg QHS PO  Last administered on 2/10/19at 21:01;  Start 2 /4/19 at 21:00


Ceftriaxone Sodium (Rocephin) 1 gm Q24H IVP  Last administered on 2/4/19at 21:40

;  Start 2/4/19 at 21:00;  Stop 2/5/19 at 11:06;  Status DC


Leucovorin Calcium (Wellcovorin) 15 mg Q6HRS PO  Last administered on 2/11/19at 

04:38;  Start 2/5/19 at 12:00


Ampicillin Sodium/ Sulbactam Sodium 3 gm/Sodium Chloride 100 ml @  200 mls/hr 

Q6HRS IV  Last administered on 2/8/19at 05:20;  Start 2/5/19 at 12:00;  Stop 2/8 /19 at 11:37;  Status DC


Multi-Ingredient Mouthwash/Gargle (Magic Mouthwash) 10 ml PRN QID  PRN PO MOUTH 

PAIN Last administered on 2/6/19at 12:15;  Start 2/6/19 at 08:30


Ondansetron HCl (Zofran) 4 mg PRN Q4HRS  PRN IV NAUSEA/VOMITING Last 

administered on 2/7/19at 08:38;  Start 2/7/19 at 08:45


Iohexol (Omnipaque 300 Mg/ml) 75 ml 1X  ONCE IV  Last administered on 2/7/19at 

10:00;  Start 2/7/19 at 10:00;  Stop 2/7/19 at 10:01;  Status DC


Info (CONTRAST GIVEN -- Rx MONITORING) 1 each PRN DAILY  PRN MC SEE COMMENTS;  

Start 2/7/19 at 09:45;  Stop 2/9/19 at 09:44;  Status DC


Amoxicillin/ Clavulanate Potassium (Augmentin 875/ 125mg) 1 tab BID PO  Last 

administered on 2/10/19at 21:01;  Start 2/8/19 at 21:00


Multivitamins/ Minerals (I-Josias) 1 tab BID PO  Last administered on 2/10/19at 21

:01;  Start 2/9/19 at 10:00


Artificial Tears (Artificial Tears) 1 drop PRN Q15MIN  PRN OU DRY EYE;  Start 2/ 9/19 at 15:30





Active Scripts


Active


Reported


Preservision Areds Softgel (Vit A/Vit C/Vit E/Zinc/Copper) 1 Each Capsule 1 

Each PO BID


Hydrocodone-Apap 5-325  ** (Hydrocodone Bit/Acetaminophen) 1 Tab Tablet 1 Tab 

PO PRN Q6HRS PRN


Xanax (Alprazolam) 0.25 Mg Tablet 0.25 Mg PO HS PRN


Basaglar Kwikpen U-100 (Insulin Glargine,Hum.rec.anlog) 100 Unit/1 Ml 

Insuln.pen 17 Unit SQ DAILYWBKFT


Methotrexate (Methotrexate Sodium) 2.5 Mg Tablet 6 Tab PO WEEKLY ON THURSDAY


Benadryl (Diphenhydramine Hcl) 25 Mg Capsule 1 Cap PO QHS


Bumetanide 2 Mg Tablet 2 Tab PO DAILY


Isosorbide Mononitrate Er (Isosorbide Mononitrate) 30 Mg Tab.er.24h 30 Mg PO 

DAILY


Prilosec Otc (Omeprazole Magnesium) 20 Mg Tablet.dr 20 Mg PO DAILY


Novolog Flexpen (Insulin Aspart) 100 Unit/1 Ml Insuln.pen 4 Unit SQ TIDBFRMEAL


Vitamin D-3 (Cholecalciferol (Vitamin D3)) 2,000 Unit Capsule 1,000 Unit PO 

DAILY


Simvastatin 40 Mg Tablet 40 Mg PO DAILY


Colace (Docusate Sodium) 100 Mg Capsule 100 Mg PO DAILY


Vitals/I & O





Vital Sign - Last 24 Hours








 2/10/19 2/10/19 2/10/19 2/10/19





 10:21 11:00 14:00 19:36


 


Temp  98.4 97.8 98.7





  98.4 97.8 98.7


 


Pulse 97 93 67 98


 


Resp  16 16 20


 


B/P (MAP) 127/56 126/46 (72) 130/50 (76) 118/45 (69)


 


Pulse Ox  97 98 95


 


O2 Delivery  Room Air Room Air Room Air


 


    





    





 2/10/19 2/10/19 2/10/19 2/11/19





 20:00 21:01 23:46 03:14


 


Temp   99.1 98.8





   99.1 98.8


 


Pulse   73 97


 


Resp   20 20


 


B/P (MAP)   130/45 (73) 133/41 (71)


 


Pulse Ox   97 94


 


O2 Delivery Room Air Room Air Room Air Room Air


 


    





    





 2/11/19 2/11/19 2/11/19 





 04:38 05:38 06:55 


 


Temp   98.9 





   98.9 


 


Pulse   89 


 


Resp   18 


 


B/P (MAP)   134/51 (78) 


 


Pulse Ox 9  92 


 


O2 Delivery Room Air Room Air Room Air 














Intake and Output   


 


 2/10/19 2/10/19 2/11/19





 15:01 23:01 07:01


 


Intake Total 180 ml  350 ml


 


Output Total 600 ml 600 ml 


 


Balance -420 ml -600 ml 350 ml











Nutrition Consultation


Dietary Evaluation:


Recommendations by RD:  Increase Calorie Intake, Protein supplementation


Comments:  


send sarina glucerna tid


Expected Outcomes/Goals:  


to meet > 75% est nutr needs- goal ongoing





Malnutrition Findings:


Food and Nutrition Intake (Sev:  <50% est energy req 5days


Body Fat Depletion (Non Severe:  Mild Depletion


Weight Status:  Obese











HERBERTH STEWART MD Feb 11, 2019 09:19

## 2019-02-11 NOTE — PDOC
Infectious Disease Note


Subjective:


Subjective


Pt continues to feel tired


Denies F/C/S/N/V/D/SOA 


awaiting hospice evaluation





ROS:


ROS


Negative except for above.





Vital Signs:


Vital Signs





Vital Signs








  Date Time  Temp Pulse Resp B/P (MAP) Pulse Ox O2 Delivery O2 Flow Rate FiO2


 


2/11/19 06:55 98.9 89 18 134/51 (78) 92 Room Air  





 98.9       











Physical Exam:


PHYSICAL EXAM


GENERAL: Resting quietly 


HEENT:  Oral mucosal ulcerations, improving


NECK:  Supple,


LUNGS:  Clear.


HEART:  S1, S2 regular.


ABDOMEN:  Soft and nontender


EXTREMITIES:  No edema or cyanosis. Left anterior leg, below the knee is bruised

, swollen, warm and tender. 


SKIN:  No rash


CNS: Arouses to gentle stimuli, responds appropriately 


Port clean





Medications:


Inpatient Meds:





Current Medications








 Medications


  (Trade)  Dose


 Ordered  Sig/Gerry  Start Time


 Stop Time Status Last Admin


Dose Admin


 


 Acetaminophen/


 Hydrocodone Bitart


  (Lortab 5/325)  1 tab  PRN Q6HRS  PRN  2/4/19 17:30


    2/11/19 04:38


1 TAB


 


 Alprazolam


  (Xanax)  0.25 mg  PRN QHS  PRN  2/4/19 17:30


    2/10/19 21:00


0.25 MG


 


 Amoxicillin/


 Clavulanate


 Potassium


  (Augmentin 875/


 125mg)  1 tab  BID  2/8/19 21:00


    2/10/19 21:01


1 TAB


 


 Ampicillin Sodium/


 Sulbactam Sodium


 3 gm/Sodium


 Chloride  100 ml @ 


 200 mls/hr  Q6HRS  2/5/19 12:00


 2/8/19 11:37 DC 2/8/19 05:20


200 MLS/HR


 


 Artificial Tears


  (Artificial


 Tears)  1 drop  PRN Q15MIN  PRN  2/9/19 15:30


     





 


 Bumetanide


  (Bumex)  4 mg  DAILY  2/5/19 09:00


    2/10/19 10:19


4 MG


 


 Ceftriaxone Sodium


  (Rocephin)  1 gm  Q24H  2/4/19 21:00


 2/5/19 11:06 DC 2/4/19 21:40


1 GM


 


 Dextrose


  (Dextrose


 50%-Water Syringe)  12.5 gm  PRN Q15MIN  PRN  2/4/19 17:00


     





 


 Diphenhydramine


 HCl


  (Benadryl)  25 mg  QHS  2/4/19 21:00


    2/10/19 21:00


25 MG


 


 Docusate Sodium


  (Colace)  100 mg  DAILY  2/5/19 09:00


    2/10/19 10:19


100 MG


 


 Info


  (CONTRAST GIVEN


 -- Rx MONITORING)  1 each  PRN DAILY  PRN  2/7/19 09:45


 2/9/19 09:44 DC  





 


 Insulin Glargine


  (Lantus)  17 units  DAILYWBKFT  2/5/19 08:00


    2/10/19 10:17


17 UNITS


 


 Insulin Human


 Lispro


  (HumaLOG)  4 units  TIDWMEALS  2/4/19 18:00


    2/10/19 18:11


4 UNITS


 


 Iohexol


  (Omnipaque 300


 Mg/ml)  75 ml  1X  ONCE  2/7/19 10:00


 2/7/19 10:01 DC 2/7/19 10:00


75 ML


 


 Isosorbide


 Mononitrate


  (Imdur)  30 mg  DAILY  2/5/19 09:00


    2/10/19 10:21


30 MG


 


 Leucovorin Calcium


  (Wellcovorin)  15 mg  Q6HRS  2/5/19 12:00


    2/11/19 04:38


15 MG


 


 Methotrexate


  (Rheumatrex)  15 mg  Th  2/14/19 09:00


 2/14/19 09:00 DC  





 


 Multi-Ingredient


 Mouthwash/Gargle


  (Magic Mouthwash)  10 ml  PRN QID  PRN  2/6/19 08:30


    2/6/19 12:15


10 ML


 


 Multivitamins/


 Minerals


  (I-Josias)  1 tab  BID  2/9/19 10:00


    2/10/19 21:01


1 TAB


 


 Ondansetron HCl


  (Zofran)  4 mg  PRN Q4HRS  PRN  2/7/19 08:45


    2/7/19 08:38


4 MG


 


 Pantoprazole


 Sodium


  (Protonix)  40 mg  DAILYAC  2/5/19 07:30


    2/10/19 07:40


40 MG


 


 Simvastatin


  (Zocor)  40 mg  QHS  2/4/19 21:00


    2/10/19 21:01


40 MG


 


 Sodium Chloride  1,000 ml @ 


 75 mls/hr  [id number]  2/4/19 17:30


    2/10/19 07:42


75 MLS/HR


 


 Vitamin D


  (Vitamin D3)  1,000 unit  DAILY  2/5/19 09:00


    2/10/19 10:19


1,000 UNIT











Labs:


Lab





Laboratory Tests








Test


 2/10/19


12:23 2/10/19


17:03 2/11/19


04:40 2/11/19


07:12


 


Glucose (Fingerstick)


 156 mg/dL


(70-99) 150 mg/dL


(70-99) 


 84 mg/dL


(70-99)


 


Prothrombin Time


 


 


 15.8 SEC


(11.7-14.0) 





 


Prothromb Time International


Ratio 


 


 1.3 (0.8-1.1) 


 














Objective:


Assessment:


Extensive ulcerative stomatitis, likely Methotrexate toxicity - improving


LLE hematoma. h/o fall about 2 weeks ago. 


Hematuria, ? from Methotrexate vs other. [place] 

[facility] [place] 


H/O Esophageal ca s/p chemo radiation. CT scan compatible with metastatic 

disease.


RA


H/O Lung ca


Dehydration


Elevated LFT sec to Methotrexate


Urine with e coli (2/4) sens to augmentin


urology input noted, pt is refusing for cystoscopy


A-fib





Plan:


Plan of Care


cont  Augmentin (2/8), was on Unasyn,cont through Feb 13


depending on goals of treatment can dc 


Leucovorin


urology input noted


D/w LYNETTE SHEFFIELD MD Feb 11, 2019 08:10

## 2019-02-12 NOTE — NUR
SACHI following. SACHI notified [facility] pt is discharging today. Pt aware a nurse from 
[facility] will visit her at home today. ANDREW MARTINEZ.

## 2019-02-12 NOTE — DS
DATE OF DISCHARGE:  02/12/2019



PRIMARY DIAGNOSIS:  Gross hematuria secondary to urinary tract infection.



ADDITIONAL DIAGNOSES:  Stomatitis felt due to methotrexate, anemia with

discharge hemoglobin of approximately 8, metastatic adenocarcinoma of the colon,

diabetes, atherosclerotic heart disease, history of AFib on Xarelto on admission

and urinary tract infection.



CHIEF COMPLAINT AND HISTORY OF PRESENT ILLNESS:  This 83-year-old white female

admitted through the office on the day of admission with severe stomatitis,

difficulties taking in p.o., was having hematuria as well as had vomited up some

blood several days earlier.  She was admitted for the same.



SUMMARY OF STAY:  The patient was admitted.  Blood thinners were held.  Urine

showed E. coli urinary tract infection, which was treated.  The hematuria

resolved.  The Xarelto was held.  The stomatitis gradually improved.  She was

given leucovorin as well as mouth care with improvement to where she was able to

take p.o. by the time of discharge.  She was covered with antibiotics throughout

the stay and has two more days of Augmentin at the time of discharge.  I did

have hospice visit with her during the stay with the findings on CT showing

progression of her adenocarcinoma of the esophagus with pulmonary disease at

this point in time.  She felt no further treatment that she was going to go

through no further unless hospice was instituted and they will follow her to the

home.



DISPOSITION:  The patient is discharged to home.



DIET:  ADA diet.



ACTIVITY:  As tolerated.



FOLLOWUP:  Office in 2 weeks.  Hospice to follow.



DISCHARGE MEDICATIONS:  Listed on the med rec and have been addressed and

briefly are her regular home medications short of the Xarelto, which will be

held at this point in time as well as addition of Augmentin 875 b.i.d. for 2

more days.

 



______________________________

MARSHALL RICHARDS MD



DR:  VENKATA/tu  JOB#:  [id number] / [id number]

DD:  02/12/2019 08:28  DT:  02/12/2019 08:50

## 2019-02-12 NOTE — PDOC
Infectious Disease Note


Subjective:


Subjective


pt says she is going home


mouth lesions are improving


no f/c/v/d


able tolerate po intake





ROS:


ROS


Negative except for above.





Vital Signs:


Vital Signs





Vital Signs








  Date Time  Temp Pulse Resp B/P (MAP) Pulse Ox O2 Delivery O2 Flow Rate FiO2


 


2/12/19 08:00 98.0 82 16 121/43 (69) 96 Room Air  





 98.0       











Physical Exam:


PHYSICAL EXAM


GENERAL: Resting quietly 


HEENT:  Oral mucosal ulcerations, improving


NECK:  Supple,


LUNGS:  Clear.


HEART:  S1, S2 regular.


ABDOMEN:  Soft and nontender


EXTREMITIES:  No edema or cyanosis. Left anterior leg, below the knee is bruised

, swollen, warm and tender. 


SKIN:  No rash


CNS: Arouses to gentle stimuli, responds appropriately 


Port clean





Medications:


Inpatient Meds:





Current Medications








 Medications


  (Trade)  Dose


 Ordered  Sig/Gerry  Start Time


 Stop Time Status Last Admin


Dose Admin


 


 Acetaminophen/


 Hydrocodone Bitart


  (Lortab 5/325)  1 tab  PRN Q6HRS  PRN  2/4/19 17:30


    2/11/19 04:38


1 TAB


 


 Alprazolam


  (Xanax)  0.25 mg  PRN QHS  PRN  2/4/19 17:30


    2/10/19 21:00


0.25 MG


 


 Amoxicillin/


 Clavulanate


 Potassium


  (Augmentin 875/


 125mg)  1 tab  BID  2/8/19 21:00


    2/11/19 20:35


1 TAB


 


 Ampicillin Sodium/


 Sulbactam Sodium


 3 gm/Sodium


 Chloride  100 ml @ 


 200 mls/hr  Q6HRS  2/5/19 12:00


 2/8/19 11:37 DC 2/8/19 05:20


200 MLS/HR


 


 Artificial Tears


  (Artificial


 Tears)  1 drop  PRN Q15MIN  PRN  2/9/19 15:30


     





 


 Bumetanide


  (Bumex)  4 mg  DAILY  2/5/19 09:00


    2/11/19 09:42


4 MG


 


 Ceftriaxone Sodium


  (Rocephin)  1 gm  Q24H  2/4/19 21:00


 2/5/19 11:06 DC 2/4/19 21:40


1 GM


 


 Dextrose


  (Dextrose


 50%-Water Syringe)  12.5 gm  PRN Q15MIN  PRN  2/4/19 17:00


     





 


 Diphenhydramine


 HCl


  (Benadryl)  25 mg  QHS  2/4/19 21:00


    2/11/19 20:34


25 MG


 


 Docusate Sodium


  (Colace)  100 mg  DAILY  2/5/19 09:00


    2/11/19 09:42


100 MG


 


 Info


  (CONTRAST GIVEN


 -- Rx MONITORING)  1 each  PRN DAILY  PRN  2/7/19 09:45


 2/9/19 09:44 DC  





 


 Insulin Glargine


  (Lantus)  17 units  DAILYWBKFT  2/5/19 08:00


    2/10/19 10:17


17 UNITS


 


 Insulin Human


 Lispro


  (HumaLOG)  4 units  TIDWMEALS  2/4/19 18:00


    2/11/19 18:23


4 UNITS


 


 Iohexol


  (Omnipaque 300


 Mg/ml)  75 ml  1X  ONCE  2/7/19 10:00


 2/7/19 10:01 DC 2/7/19 10:00


75 ML


 


 Isosorbide


 Mononitrate


  (Imdur)  30 mg  DAILY  2/5/19 09:00


    2/10/19 10:21


30 MG


 


 Leucovorin Calcium


  (Wellcovorin)  15 mg  Q6HRS  2/5/19 12:00


    2/12/19 06:22


15 MG


 


 Methotrexate


  (Rheumatrex)  15 mg  Th  2/14/19 09:00


 2/14/19 09:00 DC  





 


 Multi-Ingredient


 Mouthwash/Gargle


  (Magic Mouthwash)  10 ml  PRN QID  PRN  2/6/19 08:30


    2/6/19 12:15


10 ML


 


 Multivitamins/


 Minerals


  (I-Josias)  1 tab  BID  2/9/19 10:00


    2/11/19 20:34


1 TAB


 


 Ondansetron HCl


  (Zofran)  4 mg  PRN Q4HRS  PRN  2/7/19 08:45


    2/7/19 08:38


4 MG


 


 Pantoprazole


 Sodium


  (Protonix)  40 mg  DAILYAC  2/5/19 07:30


    2/11/19 09:42


40 MG


 


 Simvastatin


  (Zocor)  40 mg  QHS  2/4/19 21:00


    2/11/19 20:35


40 MG


 


 Sodium Chloride  1,000 ml @ 


 75 mls/hr  [id number]  2/4/19 17:30


 2/11/19 11:39 DC 2/10/19 07:42


75 MLS/HR


 


 Vitamin D


  (Vitamin D3)  1,000 unit  DAILY  2/5/19 09:00


    2/11/19 09:41


1,000 UNIT











Labs:


Lab





Laboratory Tests








Test


 2/11/19


11:15 2/11/19


17:02 2/11/19


20:42 2/12/19


08:18


 


Glucose (Fingerstick)


 198 mg/dL


(70-99) 169 mg/dL


(70-99) 145 mg/dL


(70-99) 132 mg/dL


(70-99)











Objective:


Assessment:


Extensive ulcerative stomatitis, likely Methotrexate toxicity -improved


LLE hematoma. h/o fall about 2 weeks ago. 


Hematuria, pt has declined further urology evaluation


H/O Esophageal ca s/p chemo radiation. CT scan compatible with metastatic 

disease.


RA


H/O Lung ca


Dehydration


Elevated LFT sec to Methotrexate


Urine with e coli (2/4) sens to augmentin


A-fib





Plan:


Plan of Care


cont  Augmentin  through Feb 13 and then dc


ok to dc from id standpoint











LYNETTE TESFAYE MD Feb 12, 2019 08:29

## 2019-02-12 NOTE — NUR
Reviewed d/c information with patient. No further questions at this time, niece coming to 
pick patient up. Discussed with SW.

## 2019-02-12 NOTE — PDOC
PROGRESS NOTES


Subjective


Subjective


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





ROS - no CP





Objective


Objective





Vital Signs








  Date Time  Temp Pulse Resp B/P (MAP) Pulse Ox O2 Delivery O2 Flow Rate FiO2


 


2/12/19 08:00 98.0 82 16 121/43 (69) 96 Room Air  





 98.0       














Intake and Output 


 


 2/12/19





 06:59


 


Intake Total 1780 ml


 


Balance 1780 ml


 


 


 


Intake Oral 1780 ml


 


# Voids 6











Physical Exam


General:  Alert, Oriented X3, No acute distress


Neuro:  Normal speech


Psych/Mental Status:  Mental status NL





Assessment


Assessment


IMPRESSION AND PLAN:





1.  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 

poorly differentiated adenocarcinoma of the esophagus diagnosed on


08/09/2018).  Completed chemoradiation therapy on 10/24/2018. 





CT 2/7/19 reveals Multiple pulmonary nodules, have increased in size and number 

since 


prior study, compatible with metastatic disease.


- Areas of subtle hepatic hypodensity, at least one of which may have 


progressed, but difficult to characterize on this exam.


- Thickening of the distal esophagus appears similar.





She prefers palliative care with hospice. 


Appreciate eval by [facility].





2.  Oral mucositis could be from methotrexate.  Continue to monitor.


Ordered MAGIC MOUTHWASH 2/6/19.





3.  Rheumatoid arthritis.  She is on methotrexate, which will be held because of


oral mucositis.





4.  Atrial fibrillation.  Continue management per Cardiology.  Xarelto was kept


on hold by Dr. Bernardo because of bleeding from oral mucosa and hematuria.





5.  History of cerebrovascular accident.





6.  Anemia.  Hemoglobin is worse at 8.9 on 02/04/2019 and 8.0 on 2/7/19.  

Continue to monitor.





Comment


Review of Relevant


I have reviewed the following items claudio (where applicable) has been applied.


Labs





Laboratory Tests








Test


 2/10/19


12:23 2/10/19


17:03 2/11/19


04:40 2/11/19


07:12


 


Glucose (Fingerstick)


 156 mg/dL


(70-99) 150 mg/dL


(70-99) 


 84 mg/dL


(70-99)


 


Prothrombin Time


 


 


 15.8 SEC


(11.7-14.0) 





 


Prothromb Time International


Ratio 


 


 1.3 (0.8-1.1) 


 





 


Test


 2/11/19


11:15 2/11/19


17:02 2/11/19


20:42 2/12/19


08:18


 


Glucose (Fingerstick)


 198 mg/dL


(70-99) 169 mg/dL


(70-99) 145 mg/dL


(70-99) 132 mg/dL


(70-99)








Laboratory Tests








Test


 2/11/19


11:15 2/11/19


17:02 2/11/19


20:42 2/12/19


08:18


 


Glucose (Fingerstick)


 198 mg/dL


(70-99) 169 mg/dL


(70-99) 145 mg/dL


(70-99) 132 mg/dL


(70-99)








Microbiology


2/4/19 Urine Culture - Final, Complete


         


2/4/19 Urine Culture Result 1 (HARRY) - Final, Complete


         


2/4/19 Antimicrobic Susceptibility - Final, Complete


Medications





Current Medications


Dextrose (Dextrose 50%-Water Syringe) 12.5 gm PRN Q15MIN  PRN IV SEE COMMENTS;  

Start 2/4/19 at 17:00


Sodium Chloride 1,000 ml @  75 mls/hr [id number] IV  Last administered on 2/10/

19at 07:42;  Start 2/4/19 at 17:30;  Stop 2/11/19 at 11:39;  Status DC


Alprazolam (Xanax) 0.25 mg PRN QHS  PRN PO ANXIETY / AGITATION Last 

administered on 2/10/19at 21:00;  Start 2/4/19 at 17:30


Diphenhydramine HCl (Benadryl) 25 mg QHS PO  Last administered on 2/11/19at 20:

34;  Start 2/4/19 at 21:00


Docusate Sodium (Colace) 100 mg DAILY PO  Last administered on 2/11/19at 09:42;

  Start 2/5/19 at 09:00


Acetaminophen/ Hydrocodone Bitart (Lortab 5/325) 1 tab PRN Q6HRS  PRN PO 

MODERATE PAIN Last administered on 2/11/19at 04:38;  Start 2/4/19 at 17:30


Insulin Glargine (Lantus) 17 units DAILYWBKFT SQ  Last administered on 2/10/

19at 10:17;  Start 2/5/19 at 08:00


Isosorbide Mononitrate (Imdur) 30 mg DAILY PO  Last administered on 2/10/19at 10

:21;  Start 2/5/19 at 09:00


Bumetanide (Bumex) 4 mg DAILY PO  Last administered on 2/11/19at 09:42;  Start 2 /5/19 at 09:00


Vitamin D (Vitamin D3) 1,000 unit DAILY PO  Last administered on 2/11/19at 09:41

;  Start 2/5/19 at 09:00


Insulin Human Lispro (HumaLOG) 4 units TIDWMEALS SQ  Last administered on 2/11/ 19at 18:23;  Start 2/4/19 at 18:00


Methotrexate (Rheumatrex) 15 mg Th PO ;  Start 2/14/19 at 09:00;  Stop 2/14/19 

at 09:00;  Status DC


Pantoprazole Sodium (Protonix) 40 mg DAILYAC PO  Last administered on 2/11/19at 

09:42;  Start 2/5/19 at 07:30


Simvastatin (Zocor) 40 mg QHS PO  Last administered on 2/11/19at 20:35;  Start 2 /4/19 at 21:00


Ceftriaxone Sodium (Rocephin) 1 gm Q24H IVP  Last administered on 2/4/19at 21:40

;  Start 2/4/19 at 21:00;  Stop 2/5/19 at 11:06;  Status DC


Leucovorin Calcium (Wellcovorin) 15 mg Q6HRS PO  Last administered on 2/12/19at 

06:22;  Start 2/5/19 at 12:00


Ampicillin Sodium/ Sulbactam Sodium 3 gm/Sodium Chloride 100 ml @  200 mls/hr 

Q6HRS IV  Last administered on 2/8/19at 05:20;  Start 2/5/19 at 12:00;  Stop 2/8 /19 at 11:37;  Status DC


Multi-Ingredient Mouthwash/Gargle (Magic Mouthwash) 10 ml PRN QID  PRN PO MOUTH 

PAIN Last administered on 2/6/19at 12:15;  Start 2/6/19 at 08:30


Ondansetron HCl (Zofran) 4 mg PRN Q4HRS  PRN IV NAUSEA/VOMITING Last 

administered on 2/7/19at 08:38;  Start 2/7/19 at 08:45


Iohexol (Omnipaque 300 Mg/ml) 75 ml 1X  ONCE IV  Last administered on 2/7/19at 

10:00;  Start 2/7/19 at 10:00;  Stop 2/7/19 at 10:01;  Status DC


Info (CONTRAST GIVEN -- Rx MONITORING) 1 each PRN DAILY  PRN MC SEE COMMENTS;  

Start 2/7/19 at 09:45;  Stop 2/9/19 at 09:44;  Status DC


Amoxicillin/ Clavulanate Potassium (Augmentin 875/ 125mg) 1 tab BID PO  Last 

administered on 2/11/19at 20:35;  Start 2/8/19 at 21:00


Multivitamins/ Minerals (I-Josias) 1 tab BID PO  Last administered on 2/11/19at 20

:34;  Start 2/9/19 at 10:00


Artificial Tears (Artificial Tears) 1 drop PRN Q15MIN  PRN OU DRY EYE;  Start 2/ 9/19 at 15:30





Active Scripts


Active


Reported


Preservision Areds Softgel (Vit A/Vit C/Vit E/Zinc/Copper) 1 Each Capsule 1 

Each PO BID


Hydrocodone-Apap 5-325  ** (Hydrocodone Bit/Acetaminophen) 1 Tab Tablet 1 Tab 

PO PRN Q6HRS PRN


Xanax (Alprazolam) 0.25 Mg Tablet 0.25 Mg PO HS PRN


Basaglar Kwikpen U-100 (Insulin Glargine,Hum.rec.anlog) 100 Unit/1 Ml 

Insuln.pen 17 Unit SQ DAILYWBKFT


Methotrexate (Methotrexate Sodium) 2.5 Mg Tablet 6 Tab PO WEEKLY ON THURSDAY


Benadryl (Diphenhydramine Hcl) 25 Mg Capsule 1 Cap PO QHS


Bumetanide 2 Mg Tablet 2 Tab PO DAILY


Isosorbide Mononitrate Er (Isosorbide Mononitrate) 30 Mg Tab.er.24h 30 Mg PO 

DAILY


Prilosec Otc (Omeprazole Magnesium) 20 Mg Tablet.dr 20 Mg PO DAILY


Novolog Flexpen (Insulin Aspart) 100 Unit/1 Ml Insuln.pen 4 Unit SQ TIDBFRMEAL


Vitamin D-3 (Cholecalciferol (Vitamin D3)) 2,000 Unit Capsule 1,000 Unit PO 

DAILY


Simvastatin 40 Mg Tablet 40 Mg PO DAILY


Colace (Docusate Sodium) 100 Mg Capsule 100 Mg PO DAILY


Vitals/I & O





Vital Sign - Last 24 Hours








 2/11/19 2/11/19 2/11/19 2/11/19





 09:00 11:26 19:50 20:00


 


Temp  98.6 98.2 





  98.6 98.2 


 


Pulse 89 84 96 


 


Resp  18 16 


 


B/P (MAP) 134/51 126/39 (68) 158/52 (87) 


 


Pulse Ox  97 99 


 


O2 Delivery  Room Air Room Air Room Air


 


    





    





 2/11/19 2/12/19 2/12/19 





 23:30 03:45 08:00 


 


Temp 98.1 98.8 98.0 





 98.1 98.8 98.0 


 


Pulse 89 83 82 


 


Resp 16 16 16 


 


B/P (MAP) 125/45 (71) 130/48 (75) 121/43 (69) 


 


Pulse Ox 95 95 96 


 


O2 Delivery Room Air Room Air Room Air 














Intake and Output   


 


 2/11/19 2/11/19 2/12/19





 14:59 22:59 06:59


 


Intake Total 340 ml 1100 ml 340 ml


 


Balance 340 ml 1100 ml 340 ml











Nutrition Consultation


Dietary Evaluation:


Recommendations by RD:  Increase Calorie Intake, Protein supplementation


Comments:  


send sarina glucerna q day


Expected Outcomes/Goals:  


to meet > 75% est nutr needs- goal ongoing





Malnutrition Findings:


Food and Nutrition Intake (Sev:  <50% est energy req 5days


Body Fat Depletion (Non Severe:  Mild Depletion


Weight Status:  Obese











HERBERTH STEWART MD Feb 12, 2019 08:54